# Patient Record
Sex: FEMALE | Race: WHITE | NOT HISPANIC OR LATINO | Employment: FULL TIME | URBAN - METROPOLITAN AREA
[De-identification: names, ages, dates, MRNs, and addresses within clinical notes are randomized per-mention and may not be internally consistent; named-entity substitution may affect disease eponyms.]

---

## 2018-01-08 PROBLEM — N80.30 PELVIC PERITONEAL ENDOMETRIOSIS: Chronic | Status: ACTIVE | Noted: 2018-01-08

## 2018-01-08 PROBLEM — N85.2 ENLARGED UTERUS: Chronic | Status: RESOLVED | Noted: 2018-01-08 | Resolved: 2018-01-08

## 2018-01-08 PROBLEM — G89.29 CHRONIC PELVIC PAIN IN FEMALE: Status: ACTIVE | Noted: 2018-01-08

## 2018-01-08 PROBLEM — R10.2 CHRONIC PELVIC PAIN IN FEMALE: Status: ACTIVE | Noted: 2018-01-08

## 2018-01-08 PROBLEM — N85.2 ENLARGED UTERUS: Chronic | Status: ACTIVE | Noted: 2018-01-08

## 2018-12-10 ENCOUNTER — OFFICE VISIT (OUTPATIENT)
Dept: URGENT CARE | Facility: CLINIC | Age: 33
End: 2018-12-10
Payer: COMMERCIAL

## 2018-12-10 VITALS
HEART RATE: 72 BPM | OXYGEN SATURATION: 100 % | TEMPERATURE: 99 F | DIASTOLIC BLOOD PRESSURE: 85 MMHG | BODY MASS INDEX: 22.88 KG/M2 | WEIGHT: 151 LBS | RESPIRATION RATE: 19 BRPM | HEIGHT: 68 IN | SYSTOLIC BLOOD PRESSURE: 119 MMHG

## 2018-12-10 DIAGNOSIS — J01.90 ACUTE SINUSITIS, RECURRENCE NOT SPECIFIED, UNSPECIFIED LOCATION: Primary | ICD-10-CM

## 2018-12-10 PROCEDURE — 96372 THER/PROPH/DIAG INJ SC/IM: CPT | Mod: S$GLB,,, | Performed by: PHYSICIAN ASSISTANT

## 2018-12-10 PROCEDURE — 99214 OFFICE O/P EST MOD 30 MIN: CPT | Mod: 25,S$GLB,, | Performed by: PHYSICIAN ASSISTANT

## 2018-12-10 PROCEDURE — 3008F BODY MASS INDEX DOCD: CPT | Mod: CPTII,S$GLB,, | Performed by: PHYSICIAN ASSISTANT

## 2018-12-10 RX ORDER — SPIRONOLACTONE 50 MG/1
50 TABLET, FILM COATED ORAL DAILY
COMMUNITY

## 2018-12-10 RX ORDER — BENZONATATE 100 MG/1
100 CAPSULE ORAL EVERY 6 HOURS PRN
Qty: 28 CAPSULE | Refills: 0 | Status: SHIPPED | OUTPATIENT
Start: 2018-12-10 | End: 2018-12-17

## 2018-12-10 RX ORDER — BETAMETHASONE SODIUM PHOSPHATE AND BETAMETHASONE ACETATE 3; 3 MG/ML; MG/ML
9 INJECTION, SUSPENSION INTRA-ARTICULAR; INTRALESIONAL; INTRAMUSCULAR; SOFT TISSUE ONCE
Status: COMPLETED | OUTPATIENT
Start: 2018-12-10 | End: 2018-12-10

## 2018-12-10 RX ORDER — AZITHROMYCIN 250 MG/1
TABLET, FILM COATED ORAL
Qty: 6 TABLET | Refills: 0 | Status: SHIPPED | OUTPATIENT
Start: 2018-12-10 | End: 2020-10-22

## 2018-12-10 RX ORDER — PROMETHAZINE HYDROCHLORIDE AND PHENYLEPHRINE HYDROCHLORIDE 6.25; 5 MG/5ML; MG/5ML
5 SYRUP ORAL 4 TIMES DAILY
Qty: 118 ML | Refills: 0 | Status: SHIPPED | OUTPATIENT
Start: 2018-12-10 | End: 2018-12-20

## 2018-12-10 RX ADMIN — BETAMETHASONE SODIUM PHOSPHATE AND BETAMETHASONE ACETATE 9 MG: 3; 3 INJECTION, SUSPENSION INTRA-ARTICULAR; INTRALESIONAL; INTRAMUSCULAR; SOFT TISSUE at 11:12

## 2018-12-10 NOTE — PROGRESS NOTES
"Subjective:       Patient ID: Therese Rivera is a 33 y.o. female.    Vitals:  height is 5' 8" (1.727 m) and weight is 68.5 kg (151 lb). Her temperature is 98.5 °F (36.9 °C). Her blood pressure is 119/85 and her pulse is 72. Her respiration is 19 and oxygen saturation is 100%.     Chief Complaint: Cough    Cough   Episode onset: 2 weeks. The problem has been rapidly worsening. The cough is productive of sputum. Associated symptoms include shortness of breath. Pertinent negatives include no chest pain, chills, ear pain, eye redness, fever, hemoptysis, myalgias, rash, sore throat or wheezing. She has tried OTC cough suppressant for the symptoms. The treatment provided no relief.       Constitution: Negative for chills, sweating, fatigue and fever.   HENT: Positive for congestion. Negative for ear pain, sinus pain, sinus pressure, sore throat and voice change.    Neck: Negative for painful lymph nodes.   Cardiovascular: Negative for chest pain.   Eyes: Negative for eye redness.   Respiratory: Positive for chest tightness, cough, sputum production and shortness of breath. Negative for bloody sputum, COPD, stridor, wheezing and asthma.    Gastrointestinal: Negative for nausea and vomiting.   Musculoskeletal: Negative for muscle ache.   Skin: Negative for rash.   Allergic/Immunologic: Negative for seasonal allergies and asthma.   Hematologic/Lymphatic: Negative for swollen lymph nodes.       Objective:      Physical Exam   Constitutional: She is oriented to person, place, and time. She appears well-developed and well-nourished. She is cooperative.  Non-toxic appearance. She does not appear ill. No distress.   HENT:   Head: Normocephalic and atraumatic.   Right Ear: Hearing, external ear and ear canal normal. Tympanic membrane is not erythematous. A middle ear effusion (serous) is present.   Left Ear: Hearing, external ear and ear canal normal. Tympanic membrane is not erythematous. A middle ear effusion (serous) is " present.   Nose: Mucosal edema present. No rhinorrhea or nasal deformity. No epistaxis. Right sinus exhibits no maxillary sinus tenderness and no frontal sinus tenderness. Left sinus exhibits no maxillary sinus tenderness and no frontal sinus tenderness.   Mouth/Throat: Uvula is midline, oropharynx is clear and moist and mucous membranes are normal. No trismus in the jaw. Normal dentition. No uvula swelling. No oropharyngeal exudate, posterior oropharyngeal edema or posterior oropharyngeal erythema.       Eyes: Conjunctivae and lids are normal. No scleral icterus.   Sclera clear bilat   Neck: Trachea normal, full passive range of motion without pain and phonation normal. Neck supple.   Cardiovascular: Normal rate, regular rhythm, normal heart sounds, intact distal pulses and normal pulses.   Pulmonary/Chest: Effort normal and breath sounds normal. No respiratory distress. She has no decreased breath sounds. She has no wheezes. She has no rhonchi.   nonproductive cough   Abdominal: Soft. Normal appearance and bowel sounds are normal. She exhibits no distension. There is no tenderness.   Musculoskeletal: Normal range of motion. She exhibits no edema or deformity.   Neurological: She is alert and oriented to person, place, and time. She exhibits normal muscle tone. Coordination normal.   Skin: Skin is warm, dry and intact. She is not diaphoretic. No pallor.   Psychiatric: She has a normal mood and affect. Her speech is normal and behavior is normal. Judgment and thought content normal. Cognition and memory are normal.   Nursing note and vitals reviewed.      Assessment:       1. Acute sinusitis, recurrence not specified, unspecified location        Plan:         Acute sinusitis, recurrence not specified, unspecified location  -     azithromycin (Z-ALESIA) 250 MG tablet; Take 2 tablets by mouth on day 1; Take 1 tablet by mouth on days 2-5  Dispense: 6 tablet; Refill: 0  -     betamethasone acetate-betamethasone sodium  phosphate injection 9 mg  -     promethazine-phenylephrine (PROMETHAZINE VC) 6.25-5 mg/5 mL Syrp; Take 5 mLs by mouth 4 (four) times daily. for 10 days  Dispense: 118 mL; Refill: 0  -     benzonatate (TESSALON PERLES) 100 MG capsule; Take 1 capsule (100 mg total) by mouth every 6 (six) hours as needed for Cough.  Dispense: 28 capsule; Refill: 0      Patient Instructions   · Follow up with your primary care in 2-5 days if symptoms have not improved, or you may return here.  · If you were referred to a specialist, please follow up with that specialty.  · If you were prescribed antibiotics, please take them to completion.  · If you were prescribed a narcotic or any medication with sedative effects, do not drive or operate heavy equipment or machinery while taking these medications.  · You must understand that you have received treatment at an Urgent Care facility only, and that you may be released before all of your medical problems are known or treated. Urgent Care facilities are not equipped to handle life threatening emergencies. It is recommended that you go to an Emergency Department for further evaluation of worsening or concerning symptoms, or possibly life threatening conditions as discussed.                                        If you  smoke, please stop smoking    You have been given a prescription for antibiotics (AZITHROMYCIN). Your symptoms will likely resolve without antibiotics. It is recommended that you monitor yourself closely for 3-5 days and fill this prescription and start the antibiotic only if signs of infection, as discussed at your visit, are present. It is important to follow these instructions due to the problem of increasing antibiotic resistance.    Symptomatic treatment:    Alternate tylenol and motrin every 4-6 hours  salt water gargles  Cold-eeze helps to reduce the duration of sore throat symptoms  Cepachol helps to numb the discomfort  Chloroseptic spray  Nasal saline spray reduces  inflammation and dryness  Warm face compresses as often as you can  Vicks vapor rub at night  Flonase OTC or Nasacort OTC  Simple foods like chicken noodle soup help  Pedialyte helps with dehydration if lacking appetite  Rest as much as you can

## 2018-12-10 NOTE — PATIENT INSTRUCTIONS

## 2020-07-06 ENCOUNTER — OFFICE VISIT (OUTPATIENT)
Dept: UROGYNECOLOGY | Facility: CLINIC | Age: 35
End: 2020-07-06
Payer: MEDICAID

## 2020-07-06 VITALS
BODY MASS INDEX: 23.19 KG/M2 | DIASTOLIC BLOOD PRESSURE: 62 MMHG | WEIGHT: 153 LBS | HEIGHT: 68 IN | SYSTOLIC BLOOD PRESSURE: 128 MMHG

## 2020-07-06 DIAGNOSIS — N30.10 IC (INTERSTITIAL CYSTITIS): Primary | ICD-10-CM

## 2020-07-06 DIAGNOSIS — N30.30 CHRONIC TRIGONITIS: ICD-10-CM

## 2020-07-06 DIAGNOSIS — N32.81 OAB (OVERACTIVE BLADDER): ICD-10-CM

## 2020-07-06 LAB
BILIRUB UR QL STRIP: NEGATIVE
GLUCOSE UR QL STRIP: NEGATIVE
KETONES UR QL STRIP: NEGATIVE
LEUKOCYTE ESTERASE UR QL STRIP: NEGATIVE
PH, POC UA: 6
POC BLOOD, URINE: NEGATIVE
POC NITRATES, URINE: NEGATIVE
PROT UR QL STRIP: NEGATIVE
SP GR UR STRIP: 1.01 (ref 1–1.03)
UROBILINOGEN UR STRIP-ACNC: 0.1 (ref 0.1–1.1)

## 2020-07-06 PROCEDURE — 99999 PR PBB SHADOW E&M-EST. PATIENT-LVL III: CPT | Mod: PBBFAC,,, | Performed by: OBSTETRICS & GYNECOLOGY

## 2020-07-06 PROCEDURE — 99204 OFFICE O/P NEW MOD 45 MIN: CPT | Mod: S$PBB,,, | Performed by: OBSTETRICS & GYNECOLOGY

## 2020-07-06 PROCEDURE — 99204 PR OFFICE/OUTPT VISIT, NEW, LEVL IV, 45-59 MIN: ICD-10-PCS | Mod: S$PBB,,, | Performed by: OBSTETRICS & GYNECOLOGY

## 2020-07-06 PROCEDURE — 99999 PR PBB SHADOW E&M-EST. PATIENT-LVL III: ICD-10-PCS | Mod: PBBFAC,,, | Performed by: OBSTETRICS & GYNECOLOGY

## 2020-07-06 PROCEDURE — 99213 OFFICE O/P EST LOW 20 MIN: CPT | Mod: PBBFAC | Performed by: OBSTETRICS & GYNECOLOGY

## 2020-07-06 RX ORDER — HYDROXYZINE HYDROCHLORIDE 25 MG/1
25 TABLET, FILM COATED ORAL NIGHTLY
Qty: 30 TABLET | Refills: 3 | Status: SHIPPED | OUTPATIENT
Start: 2020-07-06 | End: 2020-08-05

## 2020-07-06 RX ORDER — PENTOSAN POLYSULFATE SODIUM 100 MG/1
100 CAPSULE, GELATIN COATED ORAL 3 TIMES DAILY
COMMUNITY
End: 2020-10-07

## 2020-07-06 RX ORDER — HYDROXYZINE HYDROCHLORIDE 25 MG/1
25 TABLET, FILM COATED ORAL 3 TIMES DAILY
COMMUNITY

## 2020-07-06 NOTE — PATIENT INSTRUCTIONS
next list.  The Most Bothersome Foods* The Least Bothersome Foods*   Coffee - Regular & Decaf  Tea - caffeinated  Carbonated beverages - cola, non-joey, diet & caffeine-free  Alcohols - Beer, Red Wine, White Wine, Champagne  Fruits - Grapefruit, Lemon, Orange, Pineapple  Fruit Juices - Cranberry, Grapefruit, Orange, Pineapple  Vegetables - Tomato & Tomato Products  Flavor Enhancers - Hot peppers, Spicy foods, Chili, Horseradish, Vinegar, Monosodium glutamate (MSG)  Artificial Sweeteners - NutraSweet, Sweet N Low, Equal (sweetener), Saccharin  Ethnic foods - Mexican, Baljinder, [place] food Water  Milk - low-fat & whole  Fruits - Bananas, Blueberries, Honeydew melon, Pears, Raisins, Watermelon  Vegetables - Broccoli, [place] Sprouts, Cabbage, Carrots, Cauliflower, Celery, Cucumber, Mushrooms, Peas, Radishes, Squash, Zucchini, White potatoes, Sweet potatoes & yams  Poultry - Chicken, Eggs, Turkey,  Meat - Beef, Pork, Lamb  Seafood - Shrimp, Tuna fish, [place]  Grains - Oat, Rice  Snacks - Pretzels, Popcorn   *Tj JOHNSON et al. Diet and its role in interstitial cystitis/bladder pain syndrome (IC/BPS) and comorbid conditions. [employer]. [employer]. 2012 Jan 11.

## 2020-07-06 NOTE — PROGRESS NOTES
Subjective:      Patient ID: Therese Rivera is a 35 y.o. female.    Chief Complaint:  Consult, Urinary Incontinence (EZ), Other (IC), and Dyspareunia      History of Present Illness  35-year-old para 2 with history of hysterectomy as well as LS so secondary to endometriosis chronic pelvic pain patient now with urinary frequency and pressure.  Patient is known about her IC in the past.  Patient has tried Elmiron has actually been doing very well but now is having exacerbation is requesting assistance   Addition is also used hydroxyzine but not the concomitantly with Elmiron        History reviewed. No pertinent past medical history.    Past Surgical History:   Procedure Laterality Date    HYSTEROSCOPY      OOPHORECTOMY         GYN & OB History  Patient's last menstrual period was 2017 (exact date).   Date of Last Pap: 2018    OB History    Para Term  AB Living   2 2 0 0 0 0   SAB TAB Ectopic Multiple Live Births   0 0 0 0 2      # Outcome Date GA Lbr Frank/2nd Weight Sex Delivery Anes PTL Lv   2 Para            1 Para                Health Maintenance       Date Due Completion Date    Lipid Panel 1985 ---    HIV Screening 2000 ---    TETANUS VACCINE 2003 ---    Influenza Vaccine (1) 2020 ---          Family History   Problem Relation Age of Onset    Cancer Mother     Hypertension Father     COPD Father     Heart disease Father        Social History     Socioeconomic History    Marital status:      Spouse name: Not on file    Number of children: Not on file    Years of education: Not on file    Highest education level: Not on file   Occupational History    Not on file   Social Needs    Financial resource strain: Not on file    Food insecurity     Worry: Not on file     Inability: Not on file    Transportation needs     Medical: Not on file     Non-medical: Not on file   Tobacco Use    Smoking status: Never Smoker    Smokeless tobacco: Never Used  "  Substance and Sexual Activity    Alcohol use: Not on file    Drug use: Not on file    Sexual activity: Not on file   Lifestyle    Physical activity     Days per week: Not on file     Minutes per session: Not on file    Stress: Not on file   Relationships    Social connections     Talks on phone: Not on file     Gets together: Not on file     Attends [employer]regation service: Not on file     Active member of club or organization: Not on file     Attends meetings of clubs or organizations: Not on file     Relationship status: Not on file   Other Topics Concern    Not on file   Social History Narrative    Not on file       Review of Systems  Review of Systems   Genitourinary: Positive for frequency, pelvic pain and vaginal pain.          Objective:   /62   Ht 5' 8" (1.727 m)   Wt 69.4 kg (153 lb)   LMP 12/20/2017 (Exact Date)   BMI 23.26 kg/m²     Physical Exam   Anatomy is completely normal there is slight trigone tenderness  Assessment:     1. IC (interstitial cystitis)    2. Chronic trigonitis    3. OAB (overactive bladder)            Plan:     1. IC (interstitial cystitis)    2. Chronic trigonitis    3. OAB (overactive bladder)      After examination had patient present my office for we then talked at great length regarding my findings patient was very well versus she knows the hydro control her symptoms with diet and she is concerned that the exercise brought this latest flare on at this point I see no trauma to the surrounding tissue I do believe that this is a level of trigonitis which we use the web site to discuss a great length in terms of imagery so were going to go with  Bladder friendly diet which she is familiar with  Hydroxyzine at night  Urine bell 1 tablet 4 times a day  Myrbetriq.  Have given her coupon codes.  Patient many questions all addressed total time of this visit 45 min greater than 50% time 30 min direct discussion reviewing importance of medication patient will follow up with " me in 4 weeks  Patient Instructions           next list.  The Most Bothersome Foods* The Least Bothersome Foods*   Coffee - Regular & Decaf  Tea - caffeinated  Carbonated beverages - cola, non-joey, diet & caffeine-free  Alcohols - Beer, Red Wine, White Wine, Champagne  Fruits - Grapefruit, Lemon, Orange, Pineapple  Fruit Juices - Cranberry, Grapefruit, Orange, Pineapple  Vegetables - Tomato & Tomato Products  Flavor Enhancers - Hot peppers, Spicy foods, Chili, Horseradish, Vinegar, Monosodium glutamate (MSG)  Artificial Sweeteners - NutraSweet, Sweet N Low, Equal (sweetener), Saccharin  Ethnic foods - Mexican, Baljinder,  food Water  Milk - low-fat & whole  Fruits - Bananas, Blueberries, Honeydew melon, Pears, Raisins, Watermelon  Vegetables - Broccoli, [place] Sprouts, Cabbage, Carrots, Cauliflower, Celery, Cucumber, Mushrooms, Peas, Radishes, Squash, Zucchini, White potatoes, Sweet potatoes & yams  Poultry - Chicken, Eggs, Turkey,  Meat - Beef, Pork, Lamb  Seafood - Shrimp, Tuna fish, [place]  Grains - Oat, Rice  Snacks - Pretzels, Popcorn   *Tj JOHNSON et al. Diet and its role in interstitial cystitis/bladder pain syndrome (IC/BPS) and comorbid conditions. [employer]. [employer]. 2012 Jan 11.

## 2020-08-13 ENCOUNTER — OFFICE VISIT (OUTPATIENT)
Dept: UROGYNECOLOGY | Facility: CLINIC | Age: 35
End: 2020-08-13
Payer: MEDICAID

## 2020-08-13 VITALS
HEIGHT: 68 IN | WEIGHT: 162 LBS | SYSTOLIC BLOOD PRESSURE: 121 MMHG | BODY MASS INDEX: 24.55 KG/M2 | DIASTOLIC BLOOD PRESSURE: 62 MMHG

## 2020-08-13 DIAGNOSIS — N30.30 CHRONIC TRIGONITIS: ICD-10-CM

## 2020-08-13 DIAGNOSIS — N30.10 IC (INTERSTITIAL CYSTITIS): Primary | ICD-10-CM

## 2020-08-13 PROCEDURE — 99999 PR PBB SHADOW E&M-EST. PATIENT-LVL III: ICD-10-PCS | Mod: PBBFAC,,, | Performed by: OBSTETRICS & GYNECOLOGY

## 2020-08-13 PROCEDURE — 99213 OFFICE O/P EST LOW 20 MIN: CPT | Mod: PBBFAC | Performed by: OBSTETRICS & GYNECOLOGY

## 2020-08-13 PROCEDURE — 99999 PR PBB SHADOW E&M-EST. PATIENT-LVL III: CPT | Mod: PBBFAC,,, | Performed by: OBSTETRICS & GYNECOLOGY

## 2020-08-13 PROCEDURE — 99214 PR OFFICE/OUTPT VISIT, EST, LEVL IV, 30-39 MIN: ICD-10-PCS | Mod: S$PBB,,, | Performed by: OBSTETRICS & GYNECOLOGY

## 2020-08-13 PROCEDURE — 99214 OFFICE O/P EST MOD 30 MIN: CPT | Mod: S$PBB,,, | Performed by: OBSTETRICS & GYNECOLOGY

## 2020-08-13 RX ORDER — MIRABEGRON 25 MG/1
25 TABLET, FILM COATED, EXTENDED RELEASE ORAL DAILY
Qty: 30 TABLET | Refills: 11 | Status: SHIPPED | OUTPATIENT
Start: 2020-08-13 | End: 2021-08-13

## 2020-08-13 RX ORDER — NITROFURANTOIN 25; 75 MG/1; MG/1
100 CAPSULE ORAL DAILY
Qty: 30 CAPSULE | Refills: 0 | Status: SHIPPED | OUTPATIENT
Start: 2020-08-13 | End: 2020-08-13

## 2020-08-13 NOTE — PROGRESS NOTES
"Subjective:      Patient ID: Therese Rivera is a 35 y.o. female.    Chief Complaint:  Follow-up (Pt. reports "some improvement"pt. also stated she d/c'ed Myrbetriq x1wk. due to inability to completely empty bladder while taking Myrbertiq )      History of Present Illness  Patient states she is better but still has some discomfort in she had stop the Myrbetriq because she was not emptying completely      No past medical history on file.    Past Surgical History:   Procedure Laterality Date    HYSTEROSCOPY      OOPHORECTOMY         GYN & OB History  Patient's last menstrual period was 2017 (exact date).   Date of Last Pap: 2018    OB History    Para Term  AB Living   2 2 0 0 0 0   SAB TAB Ectopic Multiple Live Births   0 0 0 0 2      # Outcome Date GA Lbr Frank/2nd Weight Sex Delivery Anes PTL Lv   2 Para            1 Para                Health Maintenance       Date Due Completion Date    Hepatitis C Screening 1985 ---    Lipid Panel 1985 ---    HIV Screening 2000 ---    TETANUS VACCINE 2003 ---    Influenza Vaccine (1) 2020 ---          Family History   Problem Relation Age of Onset    Cancer Mother     Hypertension Father     COPD Father     Heart disease Father        Social History     Socioeconomic History    Marital status:      Spouse name: Not on file    Number of children: Not on file    Years of education: Not on file    Highest education level: Not on file   Occupational History    Not on file   Social Needs    Financial resource strain: Not on file    Food insecurity     Worry: Not on file     Inability: Not on file    Transportation needs     Medical: Not on file     Non-medical: Not on file   Tobacco Use    Smoking status: Never Smoker    Smokeless tobacco: Never Used   Substance and Sexual Activity    Alcohol use: Not on file    Drug use: Not on file    Sexual activity: Not on file   Lifestyle    Physical activity     Days " "per week: Not on file     Minutes per session: Not on file    Stress: Not on file   Relationships    Social connections     Talks on phone: Not on file     Gets together: Not on file     Attends [employer] service: Not on file     Active member of club or organization: Not on file     Attends meetings of clubs or organizations: Not on file     Relationship status: Not on file   Other Topics Concern    Not on file   Social History Narrative    Not on file       Review of Systems  Review of Systems  The odor of urine     Objective:   /62   Ht 5' 8" (1.727 m)   Wt 73.5 kg (162 lb)   LMP 12/20/2017 (Exact Date)   BMI 24.63 kg/m²     Physical Exam   Trigone improve  Assessment:     1. IC (interstitial cystitis)    2. Chronic trigonitis            Plan:     1. IC (interstitial cystitis)    2. Chronic trigonitis        After examination had patient presented my office we then discussed my findings.  I am going to decrease the Myrbetriq to 25    Patient will continue all medicines for the next 2 weeks.  Then she will start with bladder installations  Dimethyl sulfoxide 50 mg  Lidocaine 10 cc  Heparin 02824 units  Decadron 4 mg    Patient will do this once a week for 4 weeks.  Patient will then follow up with me if there is the substantial improvement patient will complete a 6 weeks of bladder installation if there is no improvement we will go for hydro distension there is still no improvement at the completion after hydro distension the patient will be referred out for higher level of care    Total time of this visit 25 min greater 50% time 15 min in direct face-to-face discussion reviewing all aspects of care and answering patient's questions    Patient Instructions               "

## 2020-08-26 ENCOUNTER — OFFICE VISIT (OUTPATIENT)
Dept: UROGYNECOLOGY | Facility: CLINIC | Age: 35
End: 2020-08-26
Payer: MEDICAID

## 2020-08-26 VITALS
WEIGHT: 163.19 LBS | BODY MASS INDEX: 24.73 KG/M2 | DIASTOLIC BLOOD PRESSURE: 72 MMHG | HEIGHT: 68 IN | SYSTOLIC BLOOD PRESSURE: 118 MMHG

## 2020-08-26 DIAGNOSIS — N30.10 IC (INTERSTITIAL CYSTITIS): Primary | ICD-10-CM

## 2020-08-26 PROCEDURE — 99999 PR PBB SHADOW E&M-EST. PATIENT-LVL III: CPT | Mod: PBBFAC,,, | Performed by: PHYSICIAN ASSISTANT

## 2020-08-26 PROCEDURE — 99999 PR PBB SHADOW E&M-EST. PATIENT-LVL III: ICD-10-PCS | Mod: PBBFAC,,, | Performed by: PHYSICIAN ASSISTANT

## 2020-08-26 PROCEDURE — 99213 OFFICE O/P EST LOW 20 MIN: CPT | Mod: PBBFAC | Performed by: PHYSICIAN ASSISTANT

## 2020-08-26 PROCEDURE — 99212 PR OFFICE/OUTPT VISIT, EST, LEVL II, 10-19 MIN: ICD-10-PCS | Mod: S$PBB,,, | Performed by: PHYSICIAN ASSISTANT

## 2020-08-26 PROCEDURE — 99212 OFFICE O/P EST SF 10 MIN: CPT | Mod: S$PBB,,, | Performed by: PHYSICIAN ASSISTANT

## 2020-08-26 RX ORDER — TRIAMCINOLONE ACETONIDE 40 MG/ML
40 INJECTION, SUSPENSION INTRA-ARTICULAR; INTRAMUSCULAR
Status: COMPLETED | OUTPATIENT
Start: 2020-08-26 | End: 2020-08-26

## 2020-08-26 RX ORDER — LIDOCAINE HYDROCHLORIDE 10 MG/ML
1 INJECTION INFILTRATION; PERINEURAL
Status: COMPLETED | OUTPATIENT
Start: 2020-08-26 | End: 2020-08-26

## 2020-08-26 RX ADMIN — DIMETHYL SULFOXIDE 50 ML: 0.54 IRRIGANT INTRAVESICAL at 06:08

## 2020-08-26 RX ADMIN — LIDOCAINE HYDROCHLORIDE 1 ML: 10 INJECTION, SOLUTION INFILTRATION; PERINEURAL at 06:08

## 2020-08-26 RX ADMIN — TRIAMCINOLONE ACETONIDE 40 MG: 40 INJECTION, SUSPENSION INTRA-ARTICULAR; INTRAMUSCULAR at 06:08

## 2020-08-26 RX ADMIN — SODIUM BICARBONATE 2.5 MEQ: 42 INJECTION, SOLUTION INTRAVENOUS at 06:08

## 2020-08-26 RX ADMIN — HEPARIN SODIUM 10000 UNITS: 1000 INJECTION, SOLUTION INTRAVENOUS; SUBCUTANEOUS at 06:08

## 2020-08-26 NOTE — PATIENT INSTRUCTIONS
A #14 red rubber cath inserted into bladder using sterile technique. The urethral meatus was prepped with betadine prior to cath insertion.  50 cc clear blue urine drained from bladder.  A solution of RMSO 50 mLs, 1% lidocaine 20 mL, heparin 10,000 IU/ml 10 mL, kenalog 40 mg, and sodium bicarb 4.2% (2.5 mEq) was instilled into bladder without difficulty. Procedure was tolerated very well. She was instructed to hold urine for one hour before voiding.    RTC in 1 week for 2/6 bladder installation

## 2020-08-26 NOTE — PROGRESS NOTES
[facility] [id number]   [address] [id number]  2020     Therese Miguel  [id number]  1985    UROGYN FOLLOW-UP  2020     35 y.o. female,  with history of hysterectomy as well as LS so secondary to endometriosis chronic pelvic pain patient now with urinary frequency and pressure. Patient is known about her IC in the past. Patient has tried Elmiron has actually been doing very well but now is having exacerbation is requesting assistance   Addition is also used hydroxyzine but not the concomitantly with Elmiron, presents today for bladder installation.     Chief Complaint   Patient presents with    Procedure     bladder instillation #1      History reviewed. No pertinent past medical history.    Past Surgical History:   Procedure Laterality Date    HYSTEROSCOPY      OOPHORECTOMY         Family History   Problem Relation Age of Onset    Cancer Mother     Hypertension Father     COPD Father     Heart disease Father        Social History     Socioeconomic History    Marital status:      Spouse name: Not on file    Number of children: Not on file    Years of education: Not on file    Highest education level: Not on file   Occupational History    Not on file   Social Needs    Financial resource strain: Not on file    Food insecurity     Worry: Not on file     Inability: Not on file    Transportation needs     Medical: Not on file     Non-medical: Not on file   Tobacco Use    Smoking status: Never Smoker    Smokeless tobacco: Never Used   Substance and Sexual Activity    Alcohol use: Not on file    Drug use: Not on file    Sexual activity: Not on file   Lifestyle    Physical activity     Days per week: Not on file     Minutes per session: Not on file    Stress: Not on file   Relationships    Social connections     Talks on phone: Not on file     Gets together: Not on file     Attends [employer] service: Not on file     Active  "member of club or organization: Not on file     Attends meetings of clubs or organizations: Not on file     Relationship status: Not on file   Other Topics Concern    Not on file   Social History Narrative    Not on file       Current Outpatient Medications   Medication Sig Dispense Refill    hydrOXYzine HCL (ATARAX) 25 MG tablet Take 25 mg by mouth 3 (three) times daily.      baljit-m.blue-s.phos-phsal-hyo (URIBEL) 118-10-40.8-36 mg Cap Take 1 capsule by mouth 4 (four) times daily. 120 capsule 3    mirabegron (MYRBETRIQ) 25 mg Tb24 ER tablet Take 1 tablet (25 mg total) by mouth once daily. 30 tablet 11    spironolactone (ALDACTONE) 50 MG tablet Take 50 mg by mouth once daily.      sumatriptan succ/naproxen sod (TREXIMET ORAL) Take by mouth.      azithromycin (Z-ALESIA) 250 MG tablet Take 2 tablets by mouth on day 1; Take 1 tablet by mouth on days 2-5 (Patient not taking: Reported on 2020) 6 tablet 0    pentosan polysulfate (ELMIRON) 100 mg Cap Take 100 mg by mouth 3 (three) times daily.       No current facility-administered medications for this visit.        Review of patient's allergies indicates:  No Known Allergies    OB History        2    Para   2    Term   0       0    AB   0    Living   0       SAB   0    TAB   0    Ectopic   0    Multiple   0    Live Births   2                ROS  As per HPI.      Physical Exam  /72   Ht 5' 8" (1.727 m)   Wt 74 kg (163 lb 3.2 oz)   LMP 2017 (Exact Date)   BMI 24.81 kg/m²   General: alert and oriented, no acute distress  Respiratory: normal respiratory effort  Abd: soft, non-tender, non-distended  Pelvic:  deferred    Impression  1. IC (interstitial cystitis)  Bladder Installation    CANCELED: Bladder Installation     We reviewed the above issues and discussed options for short-term versus long-term management of her problems.     Plan:   A #14 red rubber cath inserted into bladder using sterile technique. The urethral meatus was " prepped with betadine prior to cath insertion.  50 cc clear blue urine drained from bladder.  A solution of RMSO 50 mLs, 1% lidocaine 20 mL, heparin 10,000 IU/ml 10 mL, kenalog 40 mg, and sodium bicarb 4.2% (2.5 mEq) was instilled into bladder without difficulty. Procedure was tolerated very well. She was instructed to hold urine for one hour before voiding.    RTC in 1 week for 2/6 bladder installation    20 minutes were spent in face to face time with this patient  10 % of this time was spent in counseling and/or coordination of care    Marvin Mejia PA-C  Division of Female Pelvic Medicine and Reconstructive Surgery  Department of Obstetrics & Gynecology  [facility] [place], [place]

## 2020-08-26 NOTE — PROCEDURES
Procedures   1/6  A #14 red rubber cath inserted into bladder using sterile technique. The urethral meatus was prepped with betadine prior to cath insertion.  50 cc clear blue urine drained from bladder.  A solution of RMSO 50 mLs, 1% lidocaine 20 mL, heparin 10,000 IU/ml 10 mL, kenalog 40 mg, and sodium bicarb 4.2% (2.5 mEq) was instilled into bladder without difficulty. Procedure was tolerated very well. She was instructed to hold urine for one hour before voiding.

## 2020-09-01 NOTE — PROGRESS NOTES
2/6  A #14 red rubber cath inserted into bladder using sterile technique. The urethral meatus was prepped with betadine prior to cath insertion. *** cc ***yellow urine drained from bladder.  A solution of RMSO 50 mLs, 1% lidocaine 20 mL, heparin 10,000 IU/ml 10 mL, kenalog 40 mg, and sodium bicarb 4.2% (2.5 mEq) was instilled into bladder without difficulty. Procedure was tolerated very well. She was instructed to hold urine for one hour before voiding.

## 2020-09-02 ENCOUNTER — PROCEDURE VISIT (OUTPATIENT)
Dept: UROGYNECOLOGY | Facility: CLINIC | Age: 35
End: 2020-09-02
Payer: MEDICAID

## 2020-09-02 VITALS
SYSTOLIC BLOOD PRESSURE: 122 MMHG | DIASTOLIC BLOOD PRESSURE: 68 MMHG | BODY MASS INDEX: 25.13 KG/M2 | HEIGHT: 68 IN | WEIGHT: 165.81 LBS

## 2020-09-02 DIAGNOSIS — N30.10 IC (INTERSTITIAL CYSTITIS): Primary | ICD-10-CM

## 2020-09-02 PROCEDURE — 51700 PR IRRIGATION, BLADDER: ICD-10-PCS | Mod: ,,, | Performed by: PHYSICIAN ASSISTANT

## 2020-09-02 PROCEDURE — 51700 IRRIGATION OF BLADDER: CPT | Mod: ,,, | Performed by: PHYSICIAN ASSISTANT

## 2020-09-02 RX ORDER — NITROFURANTOIN 25; 75 MG/1; MG/1
CAPSULE ORAL
COMMUNITY
Start: 2020-08-14 | End: 2020-10-22

## 2020-09-02 RX ORDER — TRIAMCINOLONE ACETONIDE 40 MG/ML
40 INJECTION, SUSPENSION INTRA-ARTICULAR; INTRAMUSCULAR
Status: COMPLETED | OUTPATIENT
Start: 2020-09-02 | End: 2020-09-02

## 2020-09-02 RX ORDER — BUPIVACAINE HYDROCHLORIDE 5 MG/ML
10 INJECTION, SOLUTION EPIDURAL; INTRACAUDAL
Status: COMPLETED | OUTPATIENT
Start: 2020-09-02 | End: 2020-09-02

## 2020-09-02 RX ORDER — BUPIVACAINE HYDROCHLORIDE 5 MG/ML
20 INJECTION, SOLUTION PERINEURAL
Status: DISCONTINUED | OUTPATIENT
Start: 2020-09-02 | End: 2020-09-02

## 2020-09-02 RX ADMIN — TRIAMCINOLONE ACETONIDE 40 MG: 40 INJECTION, SUSPENSION INTRA-ARTICULAR; INTRAMUSCULAR at 10:09

## 2020-09-02 RX ADMIN — SODIUM BICARBONATE 2.5 MEQ: 42 INJECTION, SOLUTION INTRAVENOUS at 10:09

## 2020-09-02 RX ADMIN — HEPARIN SODIUM 10000 UNITS: 1000 INJECTION, SOLUTION INTRAVENOUS; SUBCUTANEOUS at 10:09

## 2020-09-02 RX ADMIN — BUPIVACAINE HYDROCHLORIDE 50 MG: 5 INJECTION, SOLUTION EPIDURAL; INTRACAUDAL; PERINEURAL at 10:09

## 2020-09-02 RX ADMIN — DIMETHYL SULFOXIDE 50 ML: 0.54 IRRIGANT INTRAVESICAL at 10:09

## 2020-09-02 NOTE — PROCEDURES
"Procedures     Bladder installation 2/6    Patient said that she had a great week after first installation, she was able to "live more life" than she has been able to for a long time.     A #14 red rubber cath inserted into bladder using sterile technique. The urethral meatus was prepped with betadine prior to cath insertion x 2.  70 cc clear yellow urine drained from bladder. A solution of RMSO 50 mLs, 1% bupivacaine 10 mL, heparin 10,000 IU/ml 10 mL, kenalog 40 mg, then and sodium bicarb 4.2% (2.5 mEq) was instilled into bladder without difficulty. Procedure was tolerated very well. She was instructed to hold urine for one hour before voiding.  "

## 2020-09-09 ENCOUNTER — PROCEDURE VISIT (OUTPATIENT)
Dept: UROGYNECOLOGY | Facility: CLINIC | Age: 35
End: 2020-09-09
Payer: MEDICAID

## 2020-09-09 VITALS
HEIGHT: 68 IN | HEART RATE: 94 BPM | SYSTOLIC BLOOD PRESSURE: 125 MMHG | WEIGHT: 166 LBS | DIASTOLIC BLOOD PRESSURE: 88 MMHG | BODY MASS INDEX: 25.16 KG/M2

## 2020-09-09 DIAGNOSIS — R39.89 BLADDER PAIN: Primary | ICD-10-CM

## 2020-09-09 DIAGNOSIS — N30.10 IC (INTERSTITIAL CYSTITIS): ICD-10-CM

## 2020-09-09 LAB
BILIRUB SERPL-MCNC: NORMAL MG/DL
BLOOD URINE, POC: NORMAL
CLARITY, POC UA: CLEAR
COLOR, POC UA: YELLOW
GLUCOSE UR QL STRIP: NORMAL
KETONES UR QL STRIP: NORMAL
LEUKOCYTE ESTERASE URINE, POC: NORMAL
NITRITE, POC UA: NORMAL
PH, POC UA: 5
PROTEIN, POC: NORMAL
SPECIFIC GRAVITY, POC UA: 1
UROBILINOGEN, POC UA: NORMAL

## 2020-09-09 PROCEDURE — 81002 URINALYSIS NONAUTO W/O SCOPE: CPT | Mod: PBBFAC | Performed by: PHYSICIAN ASSISTANT

## 2020-09-09 PROCEDURE — 51700 PR IRRIGATION, BLADDER: ICD-10-PCS | Mod: ICN,,, | Performed by: PHYSICIAN ASSISTANT

## 2020-09-09 PROCEDURE — 51700 IRRIGATION OF BLADDER: CPT | Mod: ICN,,, | Performed by: PHYSICIAN ASSISTANT

## 2020-09-09 RX ORDER — TRIAMCINOLONE ACETONIDE 40 MG/ML
40 INJECTION, SUSPENSION INTRA-ARTICULAR; INTRAMUSCULAR
Status: COMPLETED | OUTPATIENT
Start: 2020-09-09 | End: 2020-09-09

## 2020-09-09 RX ORDER — BUPIVACAINE HYDROCHLORIDE 5 MG/ML
10 INJECTION, SOLUTION EPIDURAL; INTRACAUDAL
Status: COMPLETED | OUTPATIENT
Start: 2020-09-09 | End: 2020-09-09

## 2020-09-09 RX ADMIN — HEPARIN SODIUM 10000 UNITS: 1000 INJECTION, SOLUTION INTRAVENOUS; SUBCUTANEOUS at 06:09

## 2020-09-09 RX ADMIN — BUPIVACAINE HYDROCHLORIDE 50 MG: 5 INJECTION, SOLUTION EPIDURAL; INTRACAUDAL; PERINEURAL at 06:09

## 2020-09-09 RX ADMIN — SODIUM BICARBONATE 2.5 MEQ: 0.2 INJECTION, SOLUTION INTRAVENOUS at 06:09

## 2020-09-09 RX ADMIN — TRIAMCINOLONE ACETONIDE 40 MG: 40 INJECTION, SUSPENSION INTRA-ARTICULAR; INTRAMUSCULAR at 06:09

## 2020-09-09 RX ADMIN — DIMETHYL SULFOXIDE 50 ML: 0.54 IRRIGANT INTRAVESICAL at 06:09

## 2020-09-09 NOTE — PROCEDURES
Procedures     Bladder installation 3/6  A #14 red rubber cath inserted into bladder using sterile technique. The urethral meatus was prepped with betadine prior to cath insertion x 2.  30 cc clear yellow urine drained from bladder. A solution of RMSO 50 mLs, 1% bupivacaine 10 mL, heparin 10,000 IU/ml 10 mL, kenalog 40 mg, and lasltly sodium bicarb 4.2% (2.5 mEq) was instilled into bladder without difficulty. Procedure was tolerated very well. She was instructed to hold urine for one hour before voiding.

## 2020-09-16 ENCOUNTER — PROCEDURE VISIT (OUTPATIENT)
Dept: UROGYNECOLOGY | Facility: CLINIC | Age: 35
End: 2020-09-16
Payer: MEDICAID

## 2020-09-16 VITALS — BODY MASS INDEX: 25.01 KG/M2 | WEIGHT: 165 LBS | HEIGHT: 68 IN

## 2020-09-16 DIAGNOSIS — N30.10 IC (INTERSTITIAL CYSTITIS): Primary | ICD-10-CM

## 2020-09-16 PROCEDURE — 51700 PR IRRIGATION, BLADDER: ICD-10-PCS | Mod: ,,, | Performed by: PHYSICIAN ASSISTANT

## 2020-09-16 PROCEDURE — 51700 IRRIGATION OF BLADDER: CPT | Mod: ,,, | Performed by: PHYSICIAN ASSISTANT

## 2020-09-16 RX ORDER — TRIAMCINOLONE ACETONIDE 40 MG/ML
40 INJECTION, SUSPENSION INTRA-ARTICULAR; INTRAMUSCULAR
Status: COMPLETED | OUTPATIENT
Start: 2020-09-16 | End: 2020-09-30

## 2020-09-16 RX ORDER — BUPIVACAINE HYDROCHLORIDE 5 MG/ML
10 INJECTION, SOLUTION EPIDURAL; INTRACAUDAL
Status: COMPLETED | OUTPATIENT
Start: 2020-09-16 | End: 2020-09-30

## 2020-09-16 RX ORDER — SPIRONOLACTONE 25 MG/1
TABLET ORAL
COMMUNITY
End: 2020-10-22

## 2020-09-16 RX ADMIN — TRIAMCINOLONE ACETONIDE 40 MG: 40 INJECTION, SUSPENSION INTRA-ARTICULAR; INTRAMUSCULAR at 05:09

## 2020-09-16 RX ADMIN — BUPIVACAINE HYDROCHLORIDE 50 MG: 5 INJECTION, SOLUTION EPIDURAL; INTRACAUDAL; PERINEURAL at 05:09

## 2020-09-16 RX ADMIN — DIMETHYL SULFOXIDE 50 ML: 0.54 IRRIGANT INTRAVESICAL at 05:09

## 2020-09-16 RX ADMIN — SODIUM BICARBONATE 2.5 MEQ: 42 INJECTION, SOLUTION INTRAVENOUS at 05:09

## 2020-09-16 RX ADMIN — HEPARIN SODIUM 10000 UNITS: 1000 INJECTION, SOLUTION INTRAVENOUS; SUBCUTANEOUS at 05:09

## 2020-09-16 NOTE — PROGRESS NOTES
Bladder installation 4/6  A #14 red rubber cath inserted into bladder using sterile technique. The urethral meatus was prepped with betadine prior to cath insertion x 2. 10 cc clear yellow urine drained from bladder. A solution of RMSO 50 mLs, 1% bupivacaine 10 mL, heparin 10,000 IU/ml 10 mL, kenalog 40 mg, and lasltly sodium bicarb 4.2% (2.5 mEq) was instilled into bladder without difficulty. Procedure was tolerated very well. She was instructed to hold urine for one hour before voiding.

## 2020-09-20 ENCOUNTER — PATIENT MESSAGE (OUTPATIENT)
Dept: UROGYNECOLOGY | Facility: CLINIC | Age: 35
End: 2020-09-20

## 2020-09-22 ENCOUNTER — TELEPHONE (OUTPATIENT)
Dept: SLEEP MEDICINE | Facility: CLINIC | Age: 35
End: 2020-09-22

## 2020-09-22 ENCOUNTER — TELEPHONE (OUTPATIENT)
Dept: UROGYNECOLOGY | Facility: CLINIC | Age: 35
End: 2020-09-22

## 2020-09-22 NOTE — TELEPHONE ENCOUNTER
I spoke with patient personally. Patient states that she had a culture that resulted staph infection. Therefore will have to cancel bladder installation on 9/23.    Procedure rescheduled on 9/30 at 2pm per patients request. Message sent to CESAR Hui for review.

## 2020-09-22 NOTE — TELEPHONE ENCOUNTER
----- Message from Hodan Dominguez sent at 9/22/2020  8:43 AM CDT -----  Regarding: Call Back  Name of Who is Calling : HERMAN DEWITT [[id number]]    Patient is requesting a call from staff in regards to canceling procedure   .....Please contact to further discuss and advise.    Can the clinic reply by [facility] : No    What Number to Call Back :  [phone]

## 2020-09-22 NOTE — TELEPHONE ENCOUNTER
----- Message from Hodan Dominguez sent at 9/22/2020  8:40 AM CDT -----  Regarding: Call Back  Name of Who is Calling : Jennie ([employer])    Jennie ([employer]) is requesting a call from staff in regards to prior authorization update on patient medication ubrogepant (UBRELVY)tablet 100 mg .....Please contact to further discuss and advise.    Can the clinic reply by [facility] : No    What Number to Call Back :  [phone] ref# [id number]

## 2020-09-30 ENCOUNTER — PROCEDURE VISIT (OUTPATIENT)
Dept: UROGYNECOLOGY | Facility: CLINIC | Age: 35
End: 2020-09-30
Payer: MEDICAID

## 2020-09-30 VITALS
SYSTOLIC BLOOD PRESSURE: 135 MMHG | HEART RATE: 78 BPM | WEIGHT: 164.44 LBS | BODY MASS INDEX: 24.92 KG/M2 | DIASTOLIC BLOOD PRESSURE: 88 MMHG | HEIGHT: 68 IN

## 2020-09-30 DIAGNOSIS — N30.10 IC (INTERSTITIAL CYSTITIS): Primary | ICD-10-CM

## 2020-09-30 DIAGNOSIS — R39.89 BLADDER PAIN: ICD-10-CM

## 2020-09-30 DIAGNOSIS — N30.30 CHRONIC TRIGONITIS: ICD-10-CM

## 2020-09-30 PROCEDURE — 51700 PR IRRIGATION, BLADDER: ICD-10-PCS | Mod: ,,, | Performed by: PHYSICIAN ASSISTANT

## 2020-09-30 PROCEDURE — 51700 IRRIGATION OF BLADDER: CPT | Mod: ,,, | Performed by: PHYSICIAN ASSISTANT

## 2020-09-30 RX ORDER — CLINDAMYCIN HYDROCHLORIDE 300 MG/1
CAPSULE ORAL
COMMUNITY
Start: 2020-09-21 | End: 2020-10-22

## 2020-09-30 RX ORDER — RIFAMPIN 300 MG/1
CAPSULE ORAL
COMMUNITY
Start: 2020-09-21 | End: 2020-10-22

## 2020-09-30 RX ADMIN — SODIUM BICARBONATE 2.5 MEQ: 42 INJECTION, SOLUTION INTRAVENOUS at 05:09

## 2020-09-30 RX ADMIN — BUPIVACAINE HYDROCHLORIDE 50 MG: 5 INJECTION, SOLUTION EPIDURAL; INTRACAUDAL; PERINEURAL at 05:09

## 2020-09-30 RX ADMIN — HEPARIN SODIUM 10000 UNITS: 1000 INJECTION, SOLUTION INTRAVENOUS; SUBCUTANEOUS at 05:09

## 2020-09-30 RX ADMIN — TRIAMCINOLONE ACETONIDE 40 MG: 40 INJECTION, SUSPENSION INTRA-ARTICULAR; INTRAMUSCULAR at 05:09

## 2020-09-30 RX ADMIN — DIMETHYL SULFOXIDE 50 ML: 0.54 IRRIGANT INTRAVESICAL at 05:09

## 2020-09-30 NOTE — PROGRESS NOTES
Bladder installation 5/6  A #14 red rubber cath inserted into bladder using sterile technique. The urethral meatus was prepped with betadine prior to cath insertion x 2.  30 cc clear yellow urine drained from bladder. A solution of RMSO 50 mLs, 1% bupivacaine 10 mL, heparin 10,000 IU/ml 10 mL, kenalog 40 mg, and lasltly sodium bicarb 4.2% (2.5 mEq) was instilled into bladder without difficulty. Procedure was tolerated very well. She was instructed to hold urine for one hour before voiding.

## 2020-09-30 NOTE — PROCEDURES
Procedures     Bladder installation 5/6  A #14 red rubber cath inserted into bladder using sterile technique. The urethral meatus was prepped with betadine prior to cath insertion x 2.  30 cc clear yellow urine drained from bladder. A solution of RMSO 50 mLs, 1% bupivacaine 10 mL, heparin 10,000 IU/ml 10 mL, kenalog 40 mg, and lasltly sodium bicarb 4.2% (2.5 mEq) was instilled into bladder without difficulty. Procedure was tolerated very well. She was instructed to hold urine for one hour before voiding.

## 2020-10-07 ENCOUNTER — PROCEDURE VISIT (OUTPATIENT)
Dept: UROGYNECOLOGY | Facility: CLINIC | Age: 35
End: 2020-10-07
Payer: MEDICAID

## 2020-10-07 VITALS
DIASTOLIC BLOOD PRESSURE: 80 MMHG | WEIGHT: 164.44 LBS | HEIGHT: 68 IN | SYSTOLIC BLOOD PRESSURE: 116 MMHG | BODY MASS INDEX: 24.92 KG/M2

## 2020-10-07 DIAGNOSIS — N30.10 IC (INTERSTITIAL CYSTITIS): Primary | ICD-10-CM

## 2020-10-07 PROCEDURE — 99211 PR OFFICE/OUTPT VISIT, EST, LEVL I: ICD-10-PCS | Mod: ,,, | Performed by: PHYSICIAN ASSISTANT

## 2020-10-07 PROCEDURE — 99211 OFF/OP EST MAY X REQ PHY/QHP: CPT | Mod: ,,, | Performed by: PHYSICIAN ASSISTANT

## 2020-10-07 RX ORDER — TRIAMCINOLONE ACETONIDE 40 MG/ML
40 INJECTION, SUSPENSION INTRA-ARTICULAR; INTRAMUSCULAR
Status: COMPLETED | OUTPATIENT
Start: 2020-10-07 | End: 2020-10-07

## 2020-10-07 RX ORDER — LIDOCAINE HYDROCHLORIDE 10 MG/ML
1 INJECTION INFILTRATION; PERINEURAL
Status: COMPLETED | OUTPATIENT
Start: 2020-10-07 | End: 2020-10-07

## 2020-10-07 RX ADMIN — DIMETHYL SULFOXIDE 50 ML: 0.54 IRRIGANT INTRAVESICAL at 05:10

## 2020-10-07 RX ADMIN — TRIAMCINOLONE ACETONIDE 40 MG: 40 INJECTION, SUSPENSION INTRA-ARTICULAR; INTRAMUSCULAR at 05:10

## 2020-10-07 RX ADMIN — HEPARIN SODIUM 10000 UNITS: 1000 INJECTION, SOLUTION INTRAVENOUS; SUBCUTANEOUS at 05:10

## 2020-10-07 RX ADMIN — LIDOCAINE HYDROCHLORIDE 1 ML: 10 INJECTION, SOLUTION INFILTRATION; PERINEURAL at 05:10

## 2020-10-07 RX ADMIN — SODIUM BICARBONATE 2.5 MEQ: 42 INJECTION, SOLUTION INTRAVENOUS at 05:10

## 2020-10-07 NOTE — PROCEDURES
Procedures     Bladder installation 6/6  A #14 red rubber cath inserted into bladder using sterile technique. The urethral meatus was prepped with betadine prior to cath insertion x 2.  30 cc clear yellow urine drained from bladder. A solution of RMSO 50 mLs, 1% bupivacaine 10 mL, heparin 10,000 IU/ml 10 mL, kenalog 40 mg, and lasltly sodium bicarb 4.2% (2.5 mEq) was instilled into bladder without difficulty. Procedure was tolerated very well. She was instructed to hold urine for one hour before voiding.

## 2020-10-19 ENCOUNTER — PATIENT MESSAGE (OUTPATIENT)
Dept: UROGYNECOLOGY | Facility: CLINIC | Age: 35
End: 2020-10-19

## 2020-10-19 ENCOUNTER — OFFICE VISIT (OUTPATIENT)
Dept: UROGYNECOLOGY | Facility: CLINIC | Age: 35
End: 2020-10-19
Payer: MEDICAID

## 2020-10-19 VITALS
SYSTOLIC BLOOD PRESSURE: 130 MMHG | WEIGHT: 159 LBS | DIASTOLIC BLOOD PRESSURE: 72 MMHG | BODY MASS INDEX: 24.1 KG/M2 | HEIGHT: 68 IN

## 2020-10-19 DIAGNOSIS — N30.30 CHRONIC TRIGONITIS: Primary | ICD-10-CM

## 2020-10-19 DIAGNOSIS — N30.10 IC (INTERSTITIAL CYSTITIS): ICD-10-CM

## 2020-10-19 PROCEDURE — 99213 OFFICE O/P EST LOW 20 MIN: CPT | Mod: S$PBB,,, | Performed by: OBSTETRICS & GYNECOLOGY

## 2020-10-19 PROCEDURE — 99999 PR PBB SHADOW E&M-EST. PATIENT-LVL IV: CPT | Mod: PBBFAC,,, | Performed by: OBSTETRICS & GYNECOLOGY

## 2020-10-19 PROCEDURE — 99214 OFFICE O/P EST MOD 30 MIN: CPT | Mod: PBBFAC | Performed by: OBSTETRICS & GYNECOLOGY

## 2020-10-19 PROCEDURE — 99213 PR OFFICE/OUTPT VISIT, EST, LEVL III, 20-29 MIN: ICD-10-PCS | Mod: S$PBB,,, | Performed by: OBSTETRICS & GYNECOLOGY

## 2020-10-19 PROCEDURE — 99999 PR PBB SHADOW E&M-EST. PATIENT-LVL IV: ICD-10-PCS | Mod: PBBFAC,,, | Performed by: OBSTETRICS & GYNECOLOGY

## 2020-10-19 NOTE — PROGRESS NOTES
Subjective:      Patient ID: Therese Rivera is a 35 y.o. female.    Chief Complaint:  No chief complaint on file.      History of Present Illness  No improvement as per patient  She is on hydroxyzine urine bell Myrbetriq and has done 6 installations          History reviewed. No pertinent past medical history.    Past Surgical History:   Procedure Laterality Date    HYSTEROSCOPY      OOPHORECTOMY         GYN & OB History  Patient's last menstrual period was 2017 (exact date).   Date of Last Pap: 2018    OB History    Para Term  AB Living   2 2 0 0 0 0   SAB TAB Ectopic Multiple Live Births   0 0 0 0 2      # Outcome Date GA Lbr Frank/2nd Weight Sex Delivery Anes PTL Lv   2 Para            1 Para                Health Maintenance       Date Due Completion Date    Hepatitis C Screening 1985 ---    Lipid Panel 1985 ---    HIV Screening 2000 ---    TETANUS VACCINE 2003 ---    Influenza Vaccine (1) 2020 ---          Family History   Problem Relation Age of Onset    Cancer Mother     Hypertension Father     COPD Father     Heart disease Father        Social History     Socioeconomic History    Marital status:      Spouse name: Not on file    Number of children: Not on file    Years of education: Not on file    Highest education level: Not on file   Occupational History    Not on file   Social Needs    Financial resource strain: Not on file    Food insecurity     Worry: Not on file     Inability: Not on file    Transportation needs     Medical: Not on file     Non-medical: Not on file   Tobacco Use    Smoking status: Never Smoker    Smokeless tobacco: Never Used   Substance and Sexual Activity    Alcohol use: Not on file    Drug use: Not on file    Sexual activity: Not on file   Lifestyle    Physical activity     Days per week: Not on file     Minutes per session: Not on file    Stress: Not on file   Relationships    Social connections      "Talks on phone: Not on file     Gets together: Not on file     Attends Christian service: Not on file     Active member of club or organization: Not on file     Attends meetings of clubs or organizations: Not on file     Relationship status: Not on file   Other Topics Concern    Not on file   Social History Narrative    Not on file       Review of Systems  Review of Systems   Genitourinary: Positive for pelvic pain and vaginal pain.          Objective:   /72   Ht 5' 8" (1.727 m)   Wt 72.1 kg (159 lb)   LMP 12/20/2017 (Exact Date)   BMI 24.18 kg/m²     Physical Exam   Trigone is definitely less tender  Assessment:     1. Chronic trigonitis    2. IC (interstitial cystitis)            Plan:     1. Chronic trigonitis    2. IC (interstitial cystitis)      After exam I had patient presented my office were going to go ahead and move forward to hydro distension which I talked at length arrangements will be be done to do this on the [place] she has specific requirements as her  is returns work after your being out of work she needs the the if November 4th week with a week Total time 15 min greater 50% time 10 min direct discussion  There are no Patient Instructions on file for this visit.        "

## 2020-10-19 NOTE — H&P (VIEW-ONLY)
Subjective:      Patient ID: Therese Rivera is a 35 y.o. female.    Chief Complaint:  No chief complaint on file.      History of Present Illness  No improvement as per patient  She is on hydroxyzine urine bell Myrbetriq and has done 6 installations          History reviewed. No pertinent past medical history.    Past Surgical History:   Procedure Laterality Date    HYSTEROSCOPY      OOPHORECTOMY         GYN & OB History  Patient's last menstrual period was 2017 (exact date).   Date of Last Pap: 2018    OB History    Para Term  AB Living   2 2 0 0 0 0   SAB TAB Ectopic Multiple Live Births   0 0 0 0 2      # Outcome Date GA Lbr Frank/2nd Weight Sex Delivery Anes PTL Lv   2 Para            1 Para                Health Maintenance       Date Due Completion Date    Hepatitis C Screening 1985 ---    Lipid Panel 1985 ---    HIV Screening 2000 ---    TETANUS VACCINE 2003 ---    Influenza Vaccine (1) 2020 ---          Family History   Problem Relation Age of Onset    Cancer Mother     Hypertension Father     COPD Father     Heart disease Father        Social History     Socioeconomic History    Marital status:      Spouse name: Not on file    Number of children: Not on file    Years of education: Not on file    Highest education level: Not on file   Occupational History    Not on file   Social Needs    Financial resource strain: Not on file    Food insecurity     Worry: Not on file     Inability: Not on file    Transportation needs     Medical: Not on file     Non-medical: Not on file   Tobacco Use    Smoking status: Never Smoker    Smokeless tobacco: Never Used   Substance and Sexual Activity    Alcohol use: Not on file    Drug use: Not on file    Sexual activity: Not on file   Lifestyle    Physical activity     Days per week: Not on file     Minutes per session: Not on file    Stress: Not on file   Relationships    Social connections      "Talks on phone: Not on file     Gets together: Not on file     Attends Buddhist service: Not on file     Active member of club or organization: Not on file     Attends meetings of clubs or organizations: Not on file     Relationship status: Not on file   Other Topics Concern    Not on file   Social History Narrative    Not on file       Review of Systems  Review of Systems   Genitourinary: Positive for pelvic pain and vaginal pain.          Objective:   /72   Ht 5' 8" (1.727 m)   Wt 72.1 kg (159 lb)   LMP 12/20/2017 (Exact Date)   BMI 24.18 kg/m²     Physical Exam   Trigone is definitely less tender  Assessment:     1. Chronic trigonitis    2. IC (interstitial cystitis)            Plan:     1. Chronic trigonitis    2. IC (interstitial cystitis)      After exam I had patient presented my office were going to go ahead and move forward to hydro distension which I talked at length arrangements will be be done to do this on the [place] she has specific requirements as her  is returns work after your being out of work she needs the the if November 4th week with a week Total time 15 min greater 50% time 10 min direct discussion  There are no Patient Instructions on file for this visit.        "

## 2020-10-21 DIAGNOSIS — Z01.818 PRE-OP TESTING: Primary | ICD-10-CM

## 2020-10-25 ENCOUNTER — LAB VISIT (OUTPATIENT)
Dept: URGENT CARE | Facility: CLINIC | Age: 35
End: 2020-10-25
Payer: MEDICAID

## 2020-10-25 DIAGNOSIS — Z01.818 PRE-OP TESTING: ICD-10-CM

## 2020-10-25 PROCEDURE — U0003 INFECTIOUS AGENT DETECTION BY NUCLEIC ACID (DNA OR RNA); SEVERE ACUTE RESPIRATORY SYNDROME CORONAVIRUS 2 (SARS-COV-2) (CORONAVIRUS DISEASE [COVID-19]), AMPLIFIED PROBE TECHNIQUE, MAKING USE OF HIGH THROUGHPUT TECHNOLOGIES AS DESCRIBED BY CMS-2020-01-R: HCPCS

## 2020-10-26 LAB — SARS-COV-2 RNA RESP QL NAA+PROBE: NOT DETECTED

## 2020-10-27 ENCOUNTER — TELEPHONE (OUTPATIENT)
Dept: UROGYNECOLOGY | Facility: CLINIC | Age: 35
End: 2020-10-27

## 2020-10-27 NOTE — DISCHARGE INSTRUCTIONS
Cystoscopy    Cystoscopy is a procedure that lets your doctor look directly inside your urethra and bladder. It can be used to:  · Help diagnose a problem with your urethra, bladder, or kidneys.  · Take a sample (biopsy) of bladder or urethral tissue.  · Treat certain problems (such as removing kidney stones).  · Place a stent to bypass an obstruction.  · Take special X-rays of the kidneys.  Based on the findings, your doctor may recommend other tests or treatments.  What is a cystoscope?  A cystoscope is a telescope-like instrument that contains lenses and fiberoptics (small glass wires that make bright light). The cystoscope may be straight and rigid, or flexible to bend around curves in the urethra. The doctor may look directly into the cystoscope, or project the image onto a monitor.  Getting ready  · Ask your doctor if you should stop taking any medicines before the procedure.  · Ask whether you should avoid eating or drinking anything after midnight before the procedure.  · Follow any other instructions your doctor gives you.  Tell your doctor before the exam if you:  · Take any medicines, such as aspirin or blood thinners  · Have allergies to any medicines  · Are pregnant   The procedure  Cystoscopy is done in the doctors office, surgery center, or hospital. The doctor and a nurse are present during the procedure. It takes only a few minutes, longer if a biopsy, X-ray, or treatment needs to be done.  During the procedure:  · You lie on an exam table on your back, knees bent and legs apart. You are covered with a drape.  · Your urethra and the area around it are washed. Anesthetic jelly may be applied to numb the urethra. Other pain medicine is usually not needed. In some cases, you may be offered a mild sedative to help you relax. If a more extensive procedure is to be done, such as a biopsy or kidney stone removal, general anesthesia may be needed.  · The cystoscope is inserted. A sterile fluid is put  into the bladder to expand it. You may feel pressure from this fluid.  · When the procedure is done, the cystoscope is removed.  After the procedure  If you had a sedative, general anesthesia, or spinal anesthesia, you must have someone drive you home. Once youre home:  · Drink plenty of fluids.  · You may have burning or light bleeding when you urinate--this is normal.  · Medicines may be prescribed to ease any discomfort or prevent infection. Take these as directed.  · Call your doctor if you have heavy bleeding or blood clots, burning that lasts more than a day, a fever over 100°F  (38° C), or trouble urinating.    After Surgery:  Always be aware that any surgery can cause these symptoms:    Pain- Medication can be prescribed for pain to decrease your pain but may not completely take your pain away.  Over the Counter pain medicine my be enough and you can always use Ice and rest to help ease pain.    Bleeding- a little bleeding after a surgery is usually within normal.  If there is a lot of blood you need to notify your MD.  Emergency treatments of bleeding are cold application, elevation of the bleeding site and compression.    Infection- Infection after surgery is NOT a normal occurrence.  Signs of infection are fever, swelling, hot to touch the incision.  If this occurs notify your MD immediately.    Nausea- this can be common after a surgery especially if you have had anesthesia medicine or are taking pain medicine.  Staying on clear liquids, bland foods, gingerale, or over the counter anti nausea medicines can help.  If you vomit more than once, notify your MD.  Anti Nausea medicines can be prescribed.    Before leaving, please make sure you have all your personal belongings such as glasses, purses, wallets, keys, cell phones, jewelry, jackets etc

## 2020-10-27 NOTE — TELEPHONE ENCOUNTER
Called and left message that surgery center will call to let her know what time to be there tomorrow and if gets canceled they will call her as well.
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
## 2020-10-29 DIAGNOSIS — Z01.818 PRE-OP TESTING: Primary | ICD-10-CM

## 2020-11-08 ENCOUNTER — LAB VISIT (OUTPATIENT)
Dept: URGENT CARE | Facility: CLINIC | Age: 35
End: 2020-11-08
Payer: MEDICAID

## 2020-11-08 DIAGNOSIS — Z78.9 KNOWN HEALTH PROBLEMS: NONE: ICD-10-CM

## 2020-11-08 DIAGNOSIS — Z01.818 PRE-OP TESTING: ICD-10-CM

## 2020-11-08 LAB — SARS-COV-2 RNA RESP QL NAA+PROBE: NOT DETECTED

## 2020-11-08 PROCEDURE — 99211 PR OFFICE/OUTPT VISIT, EST, LEVL I: ICD-10-PCS | Mod: S$GLB,,, | Performed by: FAMILY MEDICINE

## 2020-11-08 PROCEDURE — U0003 INFECTIOUS AGENT DETECTION BY NUCLEIC ACID (DNA OR RNA); SEVERE ACUTE RESPIRATORY SYNDROME CORONAVIRUS 2 (SARS-COV-2) (CORONAVIRUS DISEASE [COVID-19]), AMPLIFIED PROBE TECHNIQUE, MAKING USE OF HIGH THROUGHPUT TECHNOLOGIES AS DESCRIBED BY CMS-2020-01-R: HCPCS

## 2020-11-08 PROCEDURE — 99211 OFF/OP EST MAY X REQ PHY/QHP: CPT | Mod: S$GLB,,, | Performed by: FAMILY MEDICINE

## 2020-11-10 ENCOUNTER — ANESTHESIA EVENT (OUTPATIENT)
Dept: SURGERY | Facility: AMBULARY SURGERY CENTER | Age: 35
End: 2020-11-10
Payer: MEDICAID

## 2020-11-11 ENCOUNTER — HOSPITAL ENCOUNTER (OUTPATIENT)
Facility: AMBULARY SURGERY CENTER | Age: 35
Discharge: HOME | End: 2020-11-11
Attending: OBSTETRICS & GYNECOLOGY | Admitting: OBSTETRICS & GYNECOLOGY
Payer: MEDICAID

## 2020-11-11 ENCOUNTER — ANESTHESIA (OUTPATIENT)
Dept: SURGERY | Facility: AMBULARY SURGERY CENTER | Age: 35
End: 2020-11-11
Payer: MEDICAID

## 2020-11-11 DIAGNOSIS — N30.10 IC (INTERSTITIAL CYSTITIS): ICD-10-CM

## 2020-11-11 PROCEDURE — 52260 PR CYSTOSCOPY,DIL BLADDER,GEN ANESTH: ICD-10-PCS | Mod: ,,, | Performed by: OBSTETRICS & GYNECOLOGY

## 2020-11-11 PROCEDURE — 52260 CYSTOSCOPY AND TREATMENT: CPT | Performed by: OBSTETRICS & GYNECOLOGY

## 2020-11-11 PROCEDURE — D9220A PRA ANESTHESIA: Mod: CRNA,,, | Performed by: NURSE ANESTHETIST, CERTIFIED REGISTERED

## 2020-11-11 PROCEDURE — D9220A PRA ANESTHESIA: ICD-10-PCS | Mod: ANES,,, | Performed by: ANESTHESIOLOGY

## 2020-11-11 PROCEDURE — 52260 CYSTOSCOPY AND TREATMENT: CPT | Mod: ,,, | Performed by: OBSTETRICS & GYNECOLOGY

## 2020-11-11 PROCEDURE — D9220A PRA ANESTHESIA: ICD-10-PCS | Mod: CRNA,,, | Performed by: NURSE ANESTHETIST, CERTIFIED REGISTERED

## 2020-11-11 PROCEDURE — D9220A PRA ANESTHESIA: Mod: ANES,,, | Performed by: ANESTHESIOLOGY

## 2020-11-11 RX ORDER — HYDROMORPHONE HYDROCHLORIDE 1 MG/ML
0.2 INJECTION, SOLUTION INTRAMUSCULAR; INTRAVENOUS; SUBCUTANEOUS EVERY 5 MIN PRN
Status: DISCONTINUED | OUTPATIENT
Start: 2020-11-11 | End: 2020-11-11 | Stop reason: HOSPADM

## 2020-11-11 RX ORDER — MEPERIDINE HYDROCHLORIDE 25 MG/ML
12.5 INJECTION INTRAMUSCULAR; INTRAVENOUS; SUBCUTANEOUS ONCE AS NEEDED
Status: DISCONTINUED | OUTPATIENT
Start: 2020-11-11 | End: 2020-11-11 | Stop reason: HOSPADM

## 2020-11-11 RX ORDER — LIDOCAINE HYDROCHLORIDE 10 MG/ML
1 INJECTION, SOLUTION EPIDURAL; INFILTRATION; INTRACAUDAL; PERINEURAL ONCE
Status: COMPLETED | OUTPATIENT
Start: 2020-11-11 | End: 2020-11-11

## 2020-11-11 RX ORDER — LIDOCAINE HYDROCHLORIDE 20 MG/ML
INJECTION INTRAVENOUS
Status: DISCONTINUED | OUTPATIENT
Start: 2020-11-11 | End: 2020-11-11

## 2020-11-11 RX ORDER — FENTANYL CITRATE 50 UG/ML
25 INJECTION, SOLUTION INTRAMUSCULAR; INTRAVENOUS EVERY 5 MIN PRN
Status: DISCONTINUED | OUTPATIENT
Start: 2020-11-11 | End: 2020-11-11 | Stop reason: HOSPADM

## 2020-11-11 RX ORDER — ONDANSETRON 2 MG/ML
4 INJECTION INTRAMUSCULAR; INTRAVENOUS ONCE AS NEEDED
Status: DISCONTINUED | OUTPATIENT
Start: 2020-11-11 | End: 2020-11-11 | Stop reason: HOSPADM

## 2020-11-11 RX ORDER — PROPOFOL 10 MG/ML
VIAL (ML) INTRAVENOUS
Status: DISCONTINUED | OUTPATIENT
Start: 2020-11-11 | End: 2020-11-11

## 2020-11-11 RX ORDER — MIDAZOLAM HYDROCHLORIDE 1 MG/ML
INJECTION, SOLUTION INTRAMUSCULAR; INTRAVENOUS
Status: DISCONTINUED | OUTPATIENT
Start: 2020-11-11 | End: 2020-11-11

## 2020-11-11 RX ORDER — FENTANYL CITRATE 50 UG/ML
INJECTION, SOLUTION INTRAMUSCULAR; INTRAVENOUS
Status: DISCONTINUED | OUTPATIENT
Start: 2020-11-11 | End: 2020-11-11

## 2020-11-11 RX ORDER — SODIUM CHLORIDE, SODIUM LACTATE, POTASSIUM CHLORIDE, CALCIUM CHLORIDE 600; 310; 30; 20 MG/100ML; MG/100ML; MG/100ML; MG/100ML
75 INJECTION, SOLUTION INTRAVENOUS CONTINUOUS
Status: DISCONTINUED | OUTPATIENT
Start: 2020-11-11 | End: 2020-11-11 | Stop reason: HOSPADM

## 2020-11-11 RX ORDER — SODIUM CHLORIDE 0.9 % (FLUSH) 0.9 %
3 SYRINGE (ML) INJECTION
Status: DISCONTINUED | OUTPATIENT
Start: 2020-11-11 | End: 2020-11-11 | Stop reason: HOSPADM

## 2020-11-11 RX ORDER — DEXAMETHASONE SODIUM PHOSPHATE 4 MG/ML
INJECTION, SOLUTION INTRA-ARTICULAR; INTRALESIONAL; INTRAMUSCULAR; INTRAVENOUS; SOFT TISSUE
Status: DISCONTINUED | OUTPATIENT
Start: 2020-11-11 | End: 2020-11-11

## 2020-11-11 RX ORDER — OXYCODONE HYDROCHLORIDE 5 MG/1
5 TABLET ORAL
Status: DISCONTINUED | OUTPATIENT
Start: 2020-11-11 | End: 2020-11-11 | Stop reason: HOSPADM

## 2020-11-11 RX ORDER — ONDANSETRON 2 MG/ML
INJECTION INTRAMUSCULAR; INTRAVENOUS
Status: DISCONTINUED | OUTPATIENT
Start: 2020-11-11 | End: 2020-11-11

## 2020-11-11 RX ORDER — LIDOCAINE HYDROCHLORIDE 20 MG/ML
JELLY TOPICAL
Status: DISCONTINUED | OUTPATIENT
Start: 2020-11-11 | End: 2020-11-11 | Stop reason: HOSPADM

## 2020-11-11 RX ORDER — DIPHENHYDRAMINE HYDROCHLORIDE 50 MG/ML
25 INJECTION INTRAMUSCULAR; INTRAVENOUS EVERY 6 HOURS PRN
Status: DISCONTINUED | OUTPATIENT
Start: 2020-11-11 | End: 2020-11-11 | Stop reason: HOSPADM

## 2020-11-11 RX ORDER — SODIUM CHLORIDE, SODIUM LACTATE, POTASSIUM CHLORIDE, CALCIUM CHLORIDE 600; 310; 30; 20 MG/100ML; MG/100ML; MG/100ML; MG/100ML
INJECTION, SOLUTION INTRAVENOUS CONTINUOUS
Status: DISCONTINUED | OUTPATIENT
Start: 2020-11-11 | End: 2020-11-11 | Stop reason: HOSPADM

## 2020-11-11 RX ORDER — KETOROLAC TROMETHAMINE 30 MG/ML
INJECTION, SOLUTION INTRAMUSCULAR; INTRAVENOUS
Status: DISCONTINUED | OUTPATIENT
Start: 2020-11-11 | End: 2020-11-11

## 2020-11-11 RX ADMIN — LIDOCAINE HYDROCHLORIDE 100 MG: 20 INJECTION INTRAVENOUS at 12:11

## 2020-11-11 RX ADMIN — FENTANYL CITRATE 50 MCG: 50 INJECTION, SOLUTION INTRAMUSCULAR; INTRAVENOUS at 11:11

## 2020-11-11 RX ADMIN — MIDAZOLAM HYDROCHLORIDE 2 MG: 1 INJECTION, SOLUTION INTRAMUSCULAR; INTRAVENOUS at 11:11

## 2020-11-11 RX ADMIN — KETOROLAC TROMETHAMINE 30 MG: 30 INJECTION, SOLUTION INTRAMUSCULAR; INTRAVENOUS at 12:11

## 2020-11-11 RX ADMIN — DEXAMETHASONE SODIUM PHOSPHATE 8 MG: 4 INJECTION, SOLUTION INTRA-ARTICULAR; INTRALESIONAL; INTRAMUSCULAR; INTRAVENOUS; SOFT TISSUE at 12:11

## 2020-11-11 RX ADMIN — LIDOCAINE HYDROCHLORIDE 5 MG: 10 INJECTION, SOLUTION EPIDURAL; INFILTRATION; INTRACAUDAL; PERINEURAL at 11:11

## 2020-11-11 RX ADMIN — SODIUM CHLORIDE, SODIUM LACTATE, POTASSIUM CHLORIDE, CALCIUM CHLORIDE: 600; 310; 30; 20 INJECTION, SOLUTION INTRAVENOUS at 11:11

## 2020-11-11 RX ADMIN — ONDANSETRON 4 MG: 2 INJECTION INTRAMUSCULAR; INTRAVENOUS at 12:11

## 2020-11-11 RX ADMIN — Medication 150 MG: at 12:11

## 2020-11-11 NOTE — ANESTHESIA POSTPROCEDURE EVALUATION
Anesthesia Post Evaluation    Patient: Therese Rivera    Procedure(s) Performed: Procedure(s) (LRB):  CYSTOSCOPY, WITH BLADDER HYDRODISTENSION (N/A)    Final Anesthesia Type: general    Patient location during evaluation: PACU  Patient participation: Yes- Able to Participate  Level of consciousness: awake and alert and oriented  Post-procedure vital signs: reviewed and stable  Pain management: adequate  Airway patency: patent    PONV status at discharge: No PONV  Anesthetic complications: no      Cardiovascular status: blood pressure returned to baseline  Respiratory status: unassisted, spontaneous ventilation and room air  Hydration status: euvolemic  Follow-up not needed.          Vitals Value Taken Time   /84 11/11/20 1257   Temp 36.3 °C (97.3 °F) 11/11/20 1240   Pulse 66 11/11/20 1259   Resp 18 11/11/20 1250   SpO2 100 % 11/11/20 1259   Vitals shown include unvalidated device data.      No case tracking events are documented in the log.      Pain/Carin Score: Carin Score: 10 (11/11/2020 12:48 PM)

## 2020-11-11 NOTE — TRANSFER OF CARE
Anesthesia Transfer of Care Note    Patient: Therese Rivera    Procedure(s) Performed: Procedure(s) (LRB):  CYSTOSCOPY, WITH BLADDER HYDRODISTENSION (N/A)    Patient location: PACU    Anesthesia Type: general    Transport from OR: Transported from OR on room air with adequate spontaneous ventilation    Post pain: adequate analgesia    Post assessment: no apparent anesthetic complications and tolerated procedure well    Post vital signs: stable    Level of consciousness: awake and sedated    Nausea/Vomiting: no nausea/vomiting    Complications: none    Transfer of care protocol was followed      Last vitals:   Visit Vitals  /81   Pulse 68   Temp 36.3 °C (97.3 °F)   Resp 17   Wt 72.1 kg (158 lb 15.2 oz)   LMP 12/20/2017 (Exact Date)   SpO2 100%   Breastfeeding No   BMI 24.17 kg/m²

## 2020-11-11 NOTE — ADDENDUM NOTE
Addendum  created 11/11/20 1344 by Jon Calderon MD    Flowsheet accepted, Intraprocedure Event deleted, Intraprocedure Event edited, LDA properties accepted

## 2020-11-11 NOTE — ANESTHESIA PROCEDURE NOTES
Intubation  Performed by: Wayne Edward CRNA  Authorized by: Jon Calderon MD     Intubation:     Induction:  Intravenous    Intubated:  Postinduction    Mask Ventilation:  Easy mask    Attempts:  1    Attempted By:  CRNA    Difficult Airway Encountered?: No      Airway Device:  Supraglottic airway/LMA    Airway Device Size:  3.0    Style/Cuff Inflation:  Cuffed (inflated to minimal occlusive pressure)    Inflation Amount (mL):  10    Secured at:  The lips    Placement Verified By:  Capnometry    Findings Post-Intubation:  Atraumatic/condition of teeth unchanged and BS equal bilateral

## 2020-11-11 NOTE — OP NOTE
Title of Operation:  1) cystourethroscopy  2) hydrodistension of bladder      Indications for Surgery:  Refractory pelvic pain thought to be related to chronic trigonitis  Preoperative Diagnosis:  Chronic trigonitis    Postoperative Diagnosis:  Same    Anesthesia:  General endotracheal anesthesia.  Additionally, 10 mL of 2% viscous lidocaine were injected transurethrally into the bladder for local effect at the close of the case.    Specimen (Bacteriological, Pathological or other):  None.    Prosthetic Device/Implant:  None.    Surgeons Narrative:  Surgeon:  MD Leanna    Assistants:     IV Fluids:   crystalloid mL.    Urine Output:   200 mL per in/out cath at beginning of case.    Estimated Blood Loss:  minimal    Complications:  None.    Sponge, Lap Count:  Verified correct x 2.    Findings:    1)  Exam under anesthesia:  Normal external female genitalia. There were no lesions or lacerations.  The uterus is normal size, anteflexed.  There is minimal decensus of the cervix.  No obvious masses palpated.    2)  On cystoscopy:   Before start of the case, 200 ml of urine were drained from the bladder with a straight catheter.  Using a 70 degree cystoscope, the bladder was then instilled with 300 ml normal saline, and a systematic survey of the bladder was completed from the dome to the base to the urethrovesical junction.  During this survey, most of the bladder mucosa was normal.   Both ureteral orfices were visualized and noted to have good efflux bilaterally.   We then continued to fill the bladder to 400 mL.  After sustaining this 400 mL fill for 5 minutes, we emptied the entire contents of the bladder (450 mL), at which point there was normal bladder.  The same bladder survey was repeated after filling again to 3 had mL.   At this point, there were not areas of glomerulation >10/HPF present in for quadrants of the  bladder x 360 degrees.  There were not no other obvious lesions of masses noted.     After  completion of this final survey, a vaginal finger was used hold pressure against the proximal urethra, and urethroscopy was performed during extraction of the cystoscope.  The urethra appeared Normal, without obvious lesion, mass, or dicerticulum.  The bladder was then drained, with the volume of this contents measuring 450 mL.    Description of the Procedure:    The patient was identified in the preoperative area where consent was confirmed, and she was taken into the operating room where general endotracheal anesthesia was obtained.  She was positioned in candy cane stirrups in high lithotomy position.  Care was taken to avoid joint hyperflexion or hyperextension, and all extremity surfaces were carefully padded so as to minimize the risk of neurologic injury. Intravenous antibiotics were administered preoperatively.  Sequential compression devices as well as ESSIE hose were applied to the patient's lower extremities preoperatively.  A surgical time-out was performed where the patient was identified and procedures confirmed.  An exam under anesthesia was performed with findings as described above.  The patient's perineum and vagina were sterilely prepped and draped.    On cystoscopy:   Before start of the case, 200 ml of urine were drained from the bladder with a straight catheter.  Using a 70 degree cystoscope, the bladder was then instilled with 300 ml normal saline, and a systematic survey of the bladder was completed from the dome to the base to the urethrovesical junction.  During this survey, most of the bladder mucosa was normal.   Both ureteral orfices were visualized and noted to have good efflux bilaterally.   We then continued to fill the bladder to 400 mL.  After sustaining this 400 mL fill for 5 minutes, we emptied the entire contents of the bladder (450 mL), at which point there was normal bladder.  The same bladder survey was repeated after filling again to 3 had mL.   At this point, there were not  areas of glomerulation >10/HPF present in for quadrants of the  bladder x 360 degrees.  There were not no other obvious lesions of masses noted.     After completion of this final survey, a vaginal finger was used hold pressure against the proximal urethra, and urethroscopy was performed during extraction of the cystoscope.  The urethra appeared Normal, without obvious lesion, mass, or dicerticulum.  The bladder was then drained, with the volume of this contents measuring 450 mL.  The patient's legs were taken out of lithotomy, and she was returned to supine position.  All drapes were removed, and she was cleaned.  At the end of the case all counts were correct x 2.  She was awakened from anesthesia, extubated, and taken to the PACU in stable condition.  She tolerated the procedure well.    I was scrubbed and present for the entire procedure. I have reviewed the dictation and agree with the report.

## 2020-11-11 NOTE — DISCHARGE SUMMARY
[facility]  Discharge Note  Short Stay    Procedure(s) (LRB):  CYSTOSCOPY, WITH BLADDER HYDRODISTENSION (N/A)    OUTCOME: Patient tolerated treatment/procedure well without complication and is now ready for discharge.    DISPOSITION: Home or Self Care    FINAL DIAGNOSIS:  <principal problem not specified>    FOLLOWUP: In clinic    DISCHARGE INSTRUCTIONS:  No discharge procedures on file.

## 2020-11-11 NOTE — ANESTHESIA PREPROCEDURE EVALUATION
11/11/2020  Therese Rivera is a 35 y.o., female.    Anesthesia Evaluation    I have reviewed the Patient Summary Reports.    I have reviewed the Nursing Notes. I have reviewed the NPO Status.   I have reviewed the Medications.     Review of Systems  Neurological:   Headaches        Physical Exam  General:  Well nourished    Airway/Jaw/Neck:  Airway Findings: Mouth Opening: Normal Tongue: Normal  General Airway Assessment: Adult, Good  Mallampati: II  Improves to II with phonation.  TM Distance: 4-6 cm      Dental:  Dental Findings: In tact   Chest/Lungs:  Chest/Lungs Findings: Clear to auscultation, Normal Respiratory Rate     Heart/Vascular:  Heart Findings: Rate: Normal  Rhythm: Regular Rhythm  Sounds: Normal  Heart murmur: negative       Mental Status:  Mental Status Findings:  Cooperative, Alert and Oriented         Anesthesia Plan  Type of Anesthesia, risks & benefits discussed:  Anesthesia Type:  general  Patient's Preference:   Intra-op Monitoring Plan: standard ASA monitors  Intra-op Monitoring Plan Comments:   Post Op Pain Control Plan:   Post Op Pain Control Plan Comments:   Induction:   IV  Beta Blocker:  Patient is not currently on a Beta-Blocker (No further documentation required).       Informed Consent: Patient understands risks and agrees with Anesthesia plan.  Questions answered. Anesthesia consent signed with patient.  ASA Score: 1     Day of Surgery Review of History & Physical: I have interviewed and examined the patient. I have reviewed the patient's H&P dated:  There are no significant changes.  H&P update referred to the surgeon.  H&P completed by Anesthesiologist.       Ready For Surgery From Anesthesia Perspective.

## 2020-11-11 NOTE — PLAN OF CARE
Patient spoke with Dr Ram post procedure. She was able to walk to the bathroom. He  is driving her home. She has no additional questions.

## 2020-11-11 NOTE — INTERVAL H&P NOTE
The patient has been examined and the H&P has been reviewed:    I concur with the findings and no changes have occurred since H&P was written.    Surgery risks, benefits and alternative options discussed and understood by patient/family.          Active Hospital Problems    Diagnosis  POA    IC (interstitial cystitis) [N30.10]  Yes      Resolved Hospital Problems   No resolved problems to display.

## 2020-11-15 ENCOUNTER — OFFICE VISIT (OUTPATIENT)
Dept: URGENT CARE | Facility: CLINIC | Age: 35
End: 2020-11-15
Payer: MEDICAID

## 2020-11-15 VITALS
OXYGEN SATURATION: 98 % | HEIGHT: 68 IN | TEMPERATURE: 99 F | DIASTOLIC BLOOD PRESSURE: 82 MMHG | HEART RATE: 75 BPM | RESPIRATION RATE: 16 BRPM | WEIGHT: 157 LBS | BODY MASS INDEX: 23.79 KG/M2 | SYSTOLIC BLOOD PRESSURE: 124 MMHG

## 2020-11-15 DIAGNOSIS — B96.89 ACUTE BACTERIAL SINUSITIS: Primary | ICD-10-CM

## 2020-11-15 DIAGNOSIS — H65.01 RIGHT ACUTE SEROUS OTITIS MEDIA, RECURRENCE NOT SPECIFIED: ICD-10-CM

## 2020-11-15 DIAGNOSIS — R05.9 COUGH: ICD-10-CM

## 2020-11-15 DIAGNOSIS — J01.90 ACUTE BACTERIAL SINUSITIS: Primary | ICD-10-CM

## 2020-11-15 LAB
CTP QC/QA: YES
SARS-COV-2 RDRP RESP QL NAA+PROBE: NEGATIVE

## 2020-11-15 PROCEDURE — U0002 COVID-19 LAB TEST NON-CDC: HCPCS | Mod: QW,S$GLB,, | Performed by: NURSE PRACTITIONER

## 2020-11-15 PROCEDURE — U0002: ICD-10-PCS | Mod: QW,S$GLB,, | Performed by: NURSE PRACTITIONER

## 2020-11-15 PROCEDURE — 99214 PR OFFICE/OUTPT VISIT, EST, LEVL IV, 30-39 MIN: ICD-10-PCS | Mod: S$GLB,,, | Performed by: NURSE PRACTITIONER

## 2020-11-15 PROCEDURE — 99214 OFFICE O/P EST MOD 30 MIN: CPT | Mod: S$GLB,,, | Performed by: NURSE PRACTITIONER

## 2020-11-15 RX ORDER — PREDNISONE 10 MG/1
10 TABLET ORAL DAILY
Qty: 4 TABLET | Refills: 0 | Status: SHIPPED | OUTPATIENT
Start: 2020-11-15 | End: 2020-11-19

## 2020-11-15 RX ORDER — AZELASTINE 1 MG/ML
1 SPRAY, METERED NASAL 2 TIMES DAILY
Qty: 30 ML | Refills: 0 | Status: SHIPPED | OUTPATIENT
Start: 2020-11-15

## 2020-11-15 RX ORDER — AMOXICILLIN 875 MG/1
875 TABLET, FILM COATED ORAL EVERY 12 HOURS
Qty: 20 TABLET | Refills: 0 | Status: SHIPPED | OUTPATIENT
Start: 2020-11-15 | End: 2020-11-25

## 2020-11-15 NOTE — PATIENT INSTRUCTIONS
· You received a steroid oral script today - this can elevate your blood pressure, elevate your blood sugar, water weight gain, nervous energy, redness to the face and if given as an injection can cause dimpling of the skin where the shot goes in. If you are a diabetic, you should monitor your blood sugar three times daily for the next 4 days AND as needed for any signs of hyperglycemia. Go to nearest Emergency Department for any blood sugar over 300. There is a very small risk of osteonecrosis at site of injection but this is extremely rare and you should be re-evaluated if increasing pain, redness, swelling, or warmth experienced at site of injection.   ·   1.  Take all antibiotics as prescribed.  It is imperative that once you start them, you take them to completion unless otherwise directed.  You should not have left over antibiotics.     2.  For patients above 6 months of age who are not allergic to and are not on anticoagulants, you can alternate Tylenol and Motrin every 4-6 hours for fever above 100.4F and/or pain.  For patients less than 6 months of age, allergic to or intolerant to NSAIDS, have gastritis, gastric ulcers, or history of GI bleeds, are pregnant, or are on anticoagulant therapy, you can take Tylenol every 4 hours as needed for fever above 100.4F and/or pain.     3.  Rest and keep yourself well hydrated.  Drink hot liquids (coffee, water, tea, hot chocolate, or soup) 10-12 times a day for 5-7 days.  Put liquid in a mug and place in microwave for 2.5 - 3 minutes. Pour hot liquid into another mug not used to microwave the liquid (to avoid burning your mouth) then sniff the steam from the cup and sip the heated liquid.    4.  You can use these over the counter medications/remedies to help with your symptoms:     Runny Nose:  Use an antihistamine such as Claritin, Zyrtec or Allegra to help dry you out.     Congestion:  Use pseudoephedrine (behind the counter) for congestion- Pseudoephedrine 30 mg up  to 120 mg /day. Warning:  It can raise your blood pressure and give you palpitations.  Coricidin HBP is okay to use if you have high blood pressure.     Use mucinex (guaifenisin) up to 2400mg/day to break up/loosen any mucous. MucinexDM has a cough suppressant that can be used for cough and at night to stop the tickle in the back of your throat.    Use Nasal Saline to mechanically move any post nasal drip from your eustachian tubes or from the back of your throat.      Use Flonase or astelin 1-2 sprays/nostril per day. It is a local acting steroid nasal spray.  If you develop a bloody nose, stop using the medication immediately.    Sore throat:  Use warm, salt water gargles to ease your throat pain- 1/2 tsp salt to 1 cup warm water, gargle as desired.  Chloraseptic sprays and throat lozenges will also help to ease throat pain.     Sometimes Nyquil at night is beneficial to help you get some rest; however, it is sedating and does contain an antihistamine and Tylenol.  Make sure not to double up on these medications. Additionally you should not take this if you have high blood pressure.       These things will help you to feel better and will speed your recovery.  If your condition fails to improve in a timely manner, you should receive another evaluation by your Primary Care Provider/Pediatrician to discuss your concerns or return to urgent care for a recheck.  If your condition worsens at any time, you should report immediately to your nearest Emergency Department for further evaluation. **You must understand that you have received Urgent Care treatment only and that you may be released before all of your medical problems are known or treated. You, the patient, are responsible to arrange for follow-up care as instructed.     ·   ·   ·   · Follow up with your primary care in 2-5 days if symptoms have not improved, or you may return here.  · If you were referred to a specialist, please follow up with that  specialty.  · If you were prescribed antibiotics, please take them to completion.  · If you were prescribed a narcotic or any medication with sedative effects, do not drive or operate heavy equipment or machinery while taking these medications.  · You must understand that you have received treatment at an Urgent Care facility only, and that you may be released before all of your medical problems are known or treated. Urgent Care facilities are not equipped to handle life threatening emergencies. It is recommended that you go to an Emergency Department for further evaluation of worsening or concerning symptoms, or possibly life threatening conditions as discussed.                                        If you  smoke, please stop smoking

## 2020-11-15 NOTE — PROGRESS NOTES
"Subjective:       Patient ID: Therese Rivera is a 35 y.o. female.    Vitals:  height is 5' 8" (1.727 m) and weight is 71.2 kg (157 lb). Her temperature is 98.6 °F (37 °C). Her blood pressure is 124/82 and her pulse is 75. Her respiration is 16 and oxygen saturation is 98%.     Chief Complaint: Sore Throat (cough, sinus)    Negative covid test last week prior procedure, denies post op concerns for bladder surgical intervention. 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    Sore Throat   This is a new problem. The current episode started in the past 7 days. The problem has been gradually worsening. Neither side of throat is experiencing more pain than the other. There has been no fever. The pain is moderate. Associated symptoms include congestion, coughing, headaches (frontal, nonradiating, mild and intermittent) and swollen glands. Pertinent negatives include no abdominal pain, diarrhea, drooling, ear discharge, ear pain, hoarse voice, plugged ear sensation, neck pain, shortness of breath, stridor, trouble swallowing or vomiting. She has had no exposure to strep or mono. Treatments tried: mucinex. The treatment provided no relief.       Constitution: Negative for chills, sweating, fatigue, fever and generalized weakness.   HENT: Positive for congestion, postnasal drip and sore throat. Negative for ear pain, ear discharge, drooling, sinus pain, sinus pressure, trouble swallowing and voice change.    Neck: Positive for painful lymph nodes (left side). Negative for neck pain and neck stiffness.   Cardiovascular: Negative for chest pain and sob on exertion.   Eyes: Negative for eye discharge, eye itching and eye redness.   Respiratory: Positive for cough. Negative for chest tightness, sputum production, bloody sputum, COPD, shortness of breath, stridor, wheezing and asthma.    Gastrointestinal: Negative for abdominal pain, nausea, vomiting, constipation and diarrhea.   Genitourinary: Negative for " dysuria, frequency, urgency, urine decreased, flank pain, hematuria and history of kidney stones.        Pt with IC, no acute changes from baseline.    Musculoskeletal: Negative for joint pain, joint swelling and muscle ache.   Skin: Negative for pale, rash, lesion and erythema.   Allergic/Immunologic: Positive for seasonal allergies and immunizations up-to-date. Negative for environmental allergies, food allergies, asthma and flu shot.   Neurological: Positive for headaches (frontal, nonradiating, mild and intermittent).   Hematologic/Lymphatic: Positive for swollen lymph nodes (left side). Negative for easy bruising/bleeding. Does not bruise/bleed easily.       Objective:      Physical Exam   Constitutional: She is oriented to person, place, and time. She appears well-developed. She is cooperative.  Non-toxic appearance. She does not appear ill. No distress. normal and well-groomed  HENT:   Head: Normocephalic and atraumatic.   Ears:   Right Ear: Hearing, external ear and ear canal normal. No drainage. No mastoid tenderness. Tympanic membrane is erythematous and bulging. A middle ear effusion is present.   Left Ear: Hearing, external ear and ear canal normal. No drainage. No mastoid tenderness. Tympanic membrane is not erythematous and not bulging. A middle ear effusion is present.   Nose: Mucosal edema present. No rhinorrhea or nasal deformity. No epistaxis. Right sinus exhibits frontal sinus tenderness. Right sinus exhibits no maxillary sinus tenderness. Left sinus exhibits frontal sinus tenderness. Left sinus exhibits no maxillary sinus tenderness.   Mouth/Throat: Uvula is midline and mucous membranes are normal. Mucous membranes are not pale. No trismus in the jaw. Normal dentition. No uvula swelling. Posterior oropharyngeal erythema (with purulent post nasal drainage) present. No oropharyngeal exudate, posterior oropharyngeal edema, tonsillar abscesses or cobblestoning. Tonsils are 0 on the right. Tonsils are  0 on the left. No tonsillar exudate.   Nasal friability bilaterally.      Comments: Nasal friability bilaterally.  Eyes: Conjunctivae and lids are normal. No scleral icterus.   Neck: Trachea normal, normal range of motion, full passive range of motion without pain and phonation normal. Neck supple. No muscular tenderness present. No neck rigidity. No edema and no erythema present.   Cardiovascular: Normal rate, regular rhythm, normal heart sounds and normal pulses.   No murmur heard.  Pulmonary/Chest: Effort normal and breath sounds normal. No stridor. No respiratory distress. She has no decreased breath sounds. She has no wheezes. She has no rhonchi. She has no rales.    Comments: Normal RR and RA sats. Speaking full sentences without difficulty.  No notable coughing at present.  No evidence SOB/SETH.    Abdominal: Soft. Normal appearance. She exhibits no distension. There is no abdominal tenderness. There is no guarding.   Musculoskeletal: Normal range of motion.         General: No deformity.   Lymphadenopathy:     She has cervical adenopathy.   Neurological: She is alert and oriented to person, place, and time. She exhibits normal muscle tone. Coordination and gait normal.   Skin: Skin is warm, dry, intact, not diaphoretic, not pale and no rash. erythemajaundicePsychiatric: Her speech is normal and behavior is normal. Mood, judgment and thought content normal.   Nursing note and vitals reviewed.        Assessment:       1. Acute bacterial sinusitis    2. Cough    3. Right acute serous otitis media, recurrence not specified        Plan:     Alert, nontoxic and in NAD. Afebrile.  Patient with no evidence of respiratory distress.  Patient with sinusitis and OM symptoms.  Will test for COVID, negative,reviewed with pt.  Advised on COVID testing, signs and symptoms of COVID, symptomatic management at home, signs and symptoms to seek emergency care, CDC quarantine guidelines for COVID.  Patient verbalized understanding  and agreement treatment plan.      Acute bacterial sinusitis  -     predniSONE (DELTASONE) 10 MG tablet; Take 1 tablet (10 mg total) by mouth once daily. for 4 days  Dispense: 4 tablet; Refill: 0  -     azelastine (ASTELIN) 137 mcg (0.1 %) nasal spray; 1 spray (137 mcg total) by Nasal route 2 (two) times daily.  Dispense: 30 mL; Refill: 0  -     amoxicillin (AMOXIL) 875 MG tablet; Take 1 tablet (875 mg total) by mouth every 12 (twelve) hours. for 10 days  Dispense: 20 tablet; Refill: 0    Cough  -     POCT COVID-19 Rapid Screening    Right acute serous otitis media, recurrence not specified  -     predniSONE (DELTASONE) 10 MG tablet; Take 1 tablet (10 mg total) by mouth once daily. for 4 days  Dispense: 4 tablet; Refill: 0  -     azelastine (ASTELIN) 137 mcg (0.1 %) nasal spray; 1 spray (137 mcg total) by Nasal route 2 (two) times daily.  Dispense: 30 mL; Refill: 0          Office Visit on 11/15/2020   Component Date Value Ref Range Status    POC Rapid COVID 11/15/2020 Negative  Negative Final     Acceptable 11/15/2020 Yes   Final       Patient Instructions   · You received a steroid oral script today - this can elevate your blood pressure, elevate your blood sugar, water weight gain, nervous energy, redness to the face and if given as an injection can cause dimpling of the skin where the shot goes in. If you are a diabetic, you should monitor your blood sugar three times daily for the next 4 days AND as needed for any signs of hyperglycemia. Go to nearest Emergency Department for any blood sugar over 300. There is a very small risk of osteonecrosis at site of injection but this is extremely rare and you should be re-evaluated if increasing pain, redness, swelling, or warmth experienced at site of injection.   ·   1.  Take all antibiotics as prescribed.  It is imperative that once you start them, you take them to completion unless otherwise directed.  You should not have left over antibiotics.      2.  For patients above 6 months of age who are not allergic to and are not on anticoagulants, you can alternate Tylenol and Motrin every 4-6 hours for fever above 100.4F and/or pain.  For patients less than 6 months of age, allergic to or intolerant to NSAIDS, have gastritis, gastric ulcers, or history of GI bleeds, are pregnant, or are on anticoagulant therapy, you can take Tylenol every 4 hours as needed for fever above 100.4F and/or pain.     3.  Rest and keep yourself well hydrated.  Drink hot liquids (coffee, water, tea, hot chocolate, or soup) 10-12 times a day for 5-7 days.  Put liquid in a mug and place in microwave for 2.5 - 3 minutes. Pour hot liquid into another mug not used to microwave the liquid (to avoid burning your mouth) then sniff the steam from the cup and sip the heated liquid.    4.  You can use these over the counter medications/remedies to help with your symptoms:     Runny Nose:  Use an antihistamine such as Claritin, Zyrtec or Allegra to help dry you out.     Congestion:  Use pseudoephedrine (behind the counter) for congestion- Pseudoephedrine 30 mg up to 120 mg /day. Warning:  It can raise your blood pressure and give you palpitations.  Coricidin HBP is okay to use if you have high blood pressure.     Use mucinex (guaifenisin) up to 2400mg/day to break up/loosen any mucous. MucinexDM has a cough suppressant that can be used for cough and at night to stop the tickle in the back of your throat.    Use Nasal Saline to mechanically move any post nasal drip from your eustachian tubes or from the back of your throat.      Use Flonase or astelin 1-2 sprays/nostril per day. It is a local acting steroid nasal spray.  If you develop a bloody nose, stop using the medication immediately.    Sore throat:  Use warm, salt water gargles to ease your throat pain- 1/2 tsp salt to 1 cup warm water, gargle as desired.  Chloraseptic sprays and throat lozenges will also help to ease throat pain.      Sometimes Nyquil at night is beneficial to help you get some rest; however, it is sedating and does contain an antihistamine and Tylenol.  Make sure not to double up on these medications. Additionally you should not take this if you have high blood pressure.       These things will help you to feel better and will speed your recovery.  If your condition fails to improve in a timely manner, you should receive another evaluation by your Primary Care Provider/Pediatrician to discuss your concerns or return to urgent care for a recheck.  If your condition worsens at any time, you should report immediately to your nearest Emergency Department for further evaluation. **You must understand that you have received Urgent Care treatment only and that you may be released before all of your medical problems are known or treated. You, the patient, are responsible to arrange for follow-up care as instructed.     ·   ·   ·   · Follow up with your primary care in 2-5 days if symptoms have not improved, or you may return here.  · If you were referred to a specialist, please follow up with that specialty.  · If you were prescribed antibiotics, please take them to completion.  · If you were prescribed a narcotic or any medication with sedative effects, do not drive or operate heavy equipment or machinery while taking these medications.  · You must understand that you have received treatment at an Urgent Care facility only, and that you may be released before all of your medical problems are known or treated. Urgent Care facilities are not equipped to handle life threatening emergencies. It is recommended that you go to an Emergency Department for further evaluation of worsening or concerning symptoms, or possibly life threatening conditions as discussed.                                        If you  smoke, please stop smoking

## 2020-11-17 VITALS
DIASTOLIC BLOOD PRESSURE: 85 MMHG | SYSTOLIC BLOOD PRESSURE: 118 MMHG | RESPIRATION RATE: 18 BRPM | OXYGEN SATURATION: 99 % | HEART RATE: 55 BPM | WEIGHT: 158.94 LBS | TEMPERATURE: 97 F | BODY MASS INDEX: 24.17 KG/M2

## 2020-11-30 ENCOUNTER — OFFICE VISIT (OUTPATIENT)
Dept: UROGYNECOLOGY | Facility: CLINIC | Age: 35
End: 2020-11-30
Payer: MEDICAID

## 2020-11-30 VITALS
DIASTOLIC BLOOD PRESSURE: 70 MMHG | SYSTOLIC BLOOD PRESSURE: 118 MMHG | HEIGHT: 68 IN | WEIGHT: 154.31 LBS | BODY MASS INDEX: 23.39 KG/M2

## 2020-11-30 DIAGNOSIS — N32.81 OAB (OVERACTIVE BLADDER): ICD-10-CM

## 2020-11-30 DIAGNOSIS — N30.10 IC (INTERSTITIAL CYSTITIS): Primary | ICD-10-CM

## 2020-11-30 DIAGNOSIS — N30.30 CHRONIC TRIGONITIS: ICD-10-CM

## 2020-11-30 DIAGNOSIS — R39.89 BLADDER PAIN: ICD-10-CM

## 2020-11-30 PROCEDURE — 99213 PR OFFICE/OUTPT VISIT, EST, LEVL III, 20-29 MIN: ICD-10-PCS | Mod: S$PBB,,, | Performed by: OBSTETRICS & GYNECOLOGY

## 2020-11-30 PROCEDURE — 99213 OFFICE O/P EST LOW 20 MIN: CPT | Mod: PBBFAC | Performed by: OBSTETRICS & GYNECOLOGY

## 2020-11-30 PROCEDURE — 99999 PR PBB SHADOW E&M-EST. PATIENT-LVL III: CPT | Mod: PBBFAC,,, | Performed by: OBSTETRICS & GYNECOLOGY

## 2020-11-30 PROCEDURE — 99213 OFFICE O/P EST LOW 20 MIN: CPT | Mod: S$PBB,,, | Performed by: OBSTETRICS & GYNECOLOGY

## 2020-11-30 PROCEDURE — 99999 PR PBB SHADOW E&M-EST. PATIENT-LVL III: ICD-10-PCS | Mod: PBBFAC,,, | Performed by: OBSTETRICS & GYNECOLOGY

## 2020-11-30 NOTE — PROGRESS NOTES
Subjective:      Patient ID: Therese Rivera is a 35 y.o. female.    Chief Complaint:  Follow-up      History of Present Illness  Minimal improvement        Despite being on hydroxyzine, Urogesic times for a day, Myrbetriq  Six bladder installations with hydro distension    Will need to send patient for higher level of care          Past Medical History:   Diagnosis Date    Interstitial cystitis     Migraines        Past Surgical History:   Procedure Laterality Date    CYSTOSCOPY WITH HYDRODISTENSION OF BLADDER N/A 2020    Procedure: CYSTOSCOPY, WITH BLADDER HYDRODISTENSION;  Surgeon: Natanael Ram MD;  Location: [facility];  Service: OB/GYN;  Laterality: N/A;    HYSTERECTOMY      OOPHORECTOMY      left removed       GYN & OB History  Patient's last menstrual period was 2017 (exact date).   Date of Last Pap: No result found    OB History    Para Term  AB Living   2 2 0 0 0 0   SAB TAB Ectopic Multiple Live Births   0 0 0 0 2      # Outcome Date GA Lbr Frank/2nd Weight Sex Delivery Anes PTL Lv   2 Para            1 Para                Health Maintenance       Date Due Completion Date    Hepatitis C Screening 1985 ---    Lipid Panel 1985 ---    HIV Screening 2000 ---    TETANUS VACCINE 2003 ---    Influenza Vaccine (1) 2020 ---          Family History   Problem Relation Age of Onset    Cancer Mother     Hypertension Father     COPD Father     Heart disease Father        Social History     Socioeconomic History    Marital status:      Spouse name: Not on file    Number of children: Not on file    Years of education: Not on file    Highest education level: Not on file   Occupational History    Not on file   Social Needs    Financial resource strain: Not on file    Food insecurity     Worry: Not on file     Inability: Not on file    Transportation needs     Medical: Not on file     Non-medical: Not on file   Tobacco Use    Smoking status:  "Never Smoker    Smokeless tobacco: Never Used   Substance and Sexual Activity    Alcohol use: Never     Frequency: Never    Drug use: Not on file    Sexual activity: Not on file   Lifestyle    Physical activity     Days per week: Not on file     Minutes per session: Not on file    Stress: Not on file   Relationships    Social connections     Talks on phone: Not on file     Gets together: Not on file     Attends Tenri[employer] service: Not on file     Active member of club or organization: Not on file     Attends meetings of clubs or organizations: Not on file     Relationship status: Not on file   Other Topics Concern    Not on file   Social History Narrative    Not on file       Review of Systems  Review of Systems   Genitourinary: Positive for pelvic pain and urgency.          Objective:   /70   Ht 5' 8" (1.727 m)   Wt 70 kg (154 lb 5.2 oz)   LMP 12/20/2017 (Exact Date)   BMI 23.46 kg/m²     Physical Exam   Minimal improvement  Assessment:     1. IC (interstitial cystitis)    2. OAB (overactive bladder)    3. Chronic trigonitis    4. Bladder pain            Plan:     1. IC (interstitial cystitis)    2. OAB (overactive bladder)    3. Chronic trigonitis    4. Bladder pain      Patient will benefit from a higher level of the IC management will refer to Urology patient understanding  There are no Patient Instructions on file for this visit.        "

## 2020-12-04 NOTE — PROGRESS NOTES
Bladder installation 3/6  A #14 red rubber cath inserted into bladder using sterile technique. The urethral meatus was prepped with betadine prior to cath insertion x 2.  70 cc clear yellow urine drained from bladder. A solution of RMSO 50 mLs, 1% bupivacaine 10 mL, heparin 10,000 IU/ml 10 mL, kenalog 40 mg, then and sodium bicarb 4.2% (2.5 mEq) was instilled into bladder without difficulty. Procedure was tolerated very well. She was instructed to hold urine for one hour before voiding.   Physical Therapy  Visit Type: treatment  Precautions:  Medical precautions:  fall risk; standard precautions.  Therapist wearing gloves, goggles and mask during session.    Patient was wearing mask throughout duration of therapy session.   Lines:     Basic: O2 and telemetry      Lines in chart and on patient reviewed, cautions maintained throughout session.  Safety Measures: chair alarm, bed alarm and bed rails      SUBJECTIVE                                                                                                            Patient agreed to participate in therapy this date.  \"Thanks for letting me sleep this morning, doll.  I needed it.\"  Patient / Family Goal: return to previous functional status and return home    Pain   RN informed on pain level      OBJECTIVE                                                                                                                Oriented to person, place and time     Arousal alertness: appropriate responses to stimuli    Affect/Behavior: alert, appropriate, calm and cooperative  Patient activity tolerance: 1 to 1 activity to rest  Functional Communication/Cognition    Overall status:  Within functional limits    Form of communication:  Verbal   Attention span:  Appears intact    Commands: follows all commands and directions consistently.    Transition between tasks: transitions tasks without difficulty.    Safety judgement: good awareness of safety precautions.    Awareness of deficits: fully aware of deficits.  Bed Mobility:      Supine to sit: moderate assist and with verbal cues  Training completed:    Tasks: supine to sit    Education details: patient safety and patient requires additional training  Transfers:    Assistive devices: 2-wheeled walker, 1 person and gait belt    Sit to stand: contact guard/touching/steadying assist and with verbal cues    Stand to sit: contact guard/touching/steadying assist and with verbal cues  Training completed:      Education  details: patient safety and patient requires additional training  Gait/Ambulation:     Assistance: contact guard/touching/steadying assist (SBA)   Assistive device: 1 person, gait belt and 2-wheeled walker    Distance (ft): 300    Type: decreased benito  Training Completed:    Tasks: gait training on level surfaces    Education details: patient safety and patient requires additional training    Gait slow but steady with RW at this time.  Did 2 laps around unit.        Interventions                                                                                                       Seated    Lower Extremity: Bilateral: knee flexion, seated hip flexion, toe raises, heel raises and knee extensions, AROM, 10 reps, 1 sets        ASSESSMENT                                                                                                                Impairments: pain  Functional Limitations: all functional mobility     Discharge Recommendations   Recommendation for Discharge: PT IL: Patient requires 24 hour nonskilled assistance to perform mobility and/or ADLs safely        PT/OT Mobility Equipment for Discharge: owns RW          Skilled therapy is required to address these limitations in attempt to maximize the patient's independence.  Progress: improving as expected    End of Session:   Location: in chair  Safety measures: alarm system in place/re-engaged, call light within reach and equipment intact  Handoff to: nurse            PLAN                                                                                                                            Suggestions for next session as indicated: PT Frequency: 3 days/week  Frequency Comments: 2/3 home 12.3 (1 or 2 more sessions for bed mobility)    Interventions: bed mobility, safety education, gait training, functional transfer training and ROM  Agreement to plan and goals: patient agrees with goals and treatment plan        GOALS:  Review Date: 12/4/2020  Long Term  Goals: (to be met by time of discharge from hospital)  Sit to supine: Patient will complete sit to supine supervision (log roll technique).  Status: progressing/ongoing  Supine to sit: Patient will complete supine to sit supervision (log roll technique).  Status: progressing/ongoing  Sit to stand: Patient will complete sit to stand transfer with modified independent.  Status: progressing/ongoing  Stand to sit: Patient will complete stand to sit transfer with modified independent.  Status: progressing/ongoing  Ambulation (even): Patient will ambulate on even surface for 150 feet with 2-wheeled walker, modified independent.   Status: progressing/ongoing    Documented in the chart in the following areas: Pain. Assessment.

## 2020-12-14 ENCOUNTER — OFFICE VISIT (OUTPATIENT)
Dept: UROLOGY | Facility: CLINIC | Age: 35
End: 2020-12-14
Payer: MEDICAID

## 2020-12-14 VITALS
HEIGHT: 68 IN | BODY MASS INDEX: 24.25 KG/M2 | WEIGHT: 160 LBS | SYSTOLIC BLOOD PRESSURE: 131 MMHG | DIASTOLIC BLOOD PRESSURE: 92 MMHG | HEART RATE: 88 BPM

## 2020-12-14 DIAGNOSIS — R39.89 BLADDER PAIN: ICD-10-CM

## 2020-12-14 DIAGNOSIS — N32.81 OAB (OVERACTIVE BLADDER): ICD-10-CM

## 2020-12-14 DIAGNOSIS — N30.10 IC (INTERSTITIAL CYSTITIS): ICD-10-CM

## 2020-12-14 PROCEDURE — 99999 PR PBB SHADOW E&M-EST. PATIENT-LVL III: CPT | Mod: PBBFAC,,, | Performed by: UROLOGY

## 2020-12-14 PROCEDURE — 99204 PR OFFICE/OUTPT VISIT, NEW, LEVL IV, 45-59 MIN: ICD-10-PCS | Mod: S$PBB,,, | Performed by: UROLOGY

## 2020-12-14 PROCEDURE — 99204 OFFICE O/P NEW MOD 45 MIN: CPT | Mod: S$PBB,,, | Performed by: UROLOGY

## 2020-12-14 PROCEDURE — 99999 PR PBB SHADOW E&M-EST. PATIENT-LVL III: ICD-10-PCS | Mod: PBBFAC,,, | Performed by: UROLOGY

## 2020-12-14 PROCEDURE — 99213 OFFICE O/P EST LOW 20 MIN: CPT | Mod: PBBFAC | Performed by: UROLOGY

## 2020-12-14 NOTE — LETTER
December 14, 2020      Natanael Ram MD  [address] [place] [address] Drive  [address]  [facility] [address]           [facility] 4th Fl  [address]  [facility] [address]  Phone: [phone]          Patient: Therese Rivera   MR Number: [id number]   YOB: 1985   Date of Visit: 12/14/2020       Dear Dr. Natanael Ram:    Thank you for referring Therese Rivera to me for evaluation. Attached you will find relevant portions of my assessment and plan of care.    If you have questions, please do not hesitate to call me. I look forward to following Therese Rivera along with you.    Sincerely,    Sawyer Sol MD    Enclosure  CC:  No Recipients    If you would like to receive this communication electronically, please contact [email or web address] or [phone] to request more information on [facility] access.    For providers and/or their staff who would like to refer a patient to [facility], please contact us through our one-stop-shop provider referral line, Kelsea Benitez, at [phone].    If you feel you have received this communication in error or would no longer like to receive these types of communications, please e-mail [email or web address]

## 2020-12-14 NOTE — PROGRESS NOTES
[facility]      Bladder Pain New Note    12/14/2020    Referred by:  Natanael Ram MD    HPI: Therese Rivera is a very pleasant 35 y.o. female who is a new patient to our department referred for evaluation of suspected bladder pain of 2-3 years duration. She reports that pain is related to her voiding cycle and results in urinary frequency. This pattern began in March 2020, after about 6 months of resolution. She now notes that frequency and pain related to her voiding cycle has subsided. She continues hydroxyzine for now.    However, she continues with pelvic pain unrelated to her voiding cycle. She is not voiding frequently but associates pain with exercise and intercourse.     She denies symptoms of obstructive voiding including decreased stream, hesitancy, intermittency, post void dribbling and sense of incomplete emptying. Bladder scan PVR was 0 mL.  Her history includes prior pelvic surgery (hysterectomy for benign disease in 2018 -  When pelvic pain first started) but excludes a history of urolithiasis, hematuria, neurological symptoms/diagnoses, incontinence procedures and prolapse surgeries. She denies all other prior pelvic surgeries or neurologic diagnoses. She does not report symptoms suggestive of advanced POP.     Her medical history is significant for {endometriosis.     Previous therapies for these symptoms have included:    dietary modification: no improvement   intravesical instillations: - unsure of cocktail  (provided no benefit)   hydroxyzine:  Some improvement   cystoscopy with hydrodistention: provided some but insufficient benefit     Previous evaluation has included:    Cystoscopy: No ulcerations noted    A review of 10+ systems was conducted with pertinent positive and negative findings documented in HPI with all other systems reviewed and negative.    Past medical, family, surgical and social history reviewed as documented in chart with pertinent positive medical,  family, surgical and social history detailed in HPI.    Exam Findings:    Const: no acute distress, conversant and alert  Eyes: anicteric, extraocular muscles intact  ENMT: normocephalic, Nl oral membranes  Cardio: no cyanosis, nl cap refill  Pulm: no tachypnea; no resp distress  Abd: soft, no tenderness  Musc: no laceration, no tenderness  Neuro: alert; oriented x 3  Skin: warm, dry; no petichiae  Psych: no anxiety; normal speech     Assessment/Plan:    Bladder Pain (new, addt'l workup): She reports reports pelvic pain, pressure and discomfort that appears associated with the voiding cycle.  Urinary frequency is largely driven by pain and discomfort rather than by a desire to avoid incontinence.  The history suggests the likelihood of Bladder Pain Syndrome/InterstitialCystitis (BPS/IC) as the most likely diagnosis.  There are no clearly obvious competing etiologies after an initial evaluation, though other diagnoses will continue to be considered, particularly if symptoms do not respond to initial therapy.  We had a thorough discussion today about the nature of BPS/IC including normal bladder function, the nature of various treatment options, that no single treatment is effective in a majority of patients, and that acceptable symptom control may require trials of several therapies or combinations of therapies.  Additional educational resources were provided including printed materials and directions to the [employer] Association ([employer]) and the Interstitial Cystitis Association online resources.     She appears to have spontaneous remission of IC/BPS symptoms for now, though they are likely to return at some point in the future. She may try to discontinue the hydroxyzine if she would like.     Pelvic Pain and Pelvic Floor Dysfunction (new, addt'l workup): She continues with considerable pelvic pain related to exercise and intercourse, suggestive of chronic pelvic pain and pelvic floor dysfunction,  which is independent of her voiding cycle. I am recommending that she meet with a pelvic floor physical therapist for evaluation and assistance. I will meet back with her in 6-8 weeks for evaluation of her progress.        Review of prior external note(s): outside records reviewed today and summarized in HPI   Review of test results: urinalysis (12/14/2020) U/S for post void residual (12/14/2020)   Tests Ordered: none   Radiology independently reviewed: none   Discussion with external physician/health care professional: No discussion with external provider

## 2021-01-05 ENCOUNTER — PATIENT MESSAGE (OUTPATIENT)
Dept: UROLOGY | Facility: CLINIC | Age: 36
End: 2021-01-05

## 2021-01-11 ENCOUNTER — TELEPHONE (OUTPATIENT)
Dept: UROLOGY | Facility: CLINIC | Age: 36
End: 2021-01-11

## 2021-03-18 ENCOUNTER — PATIENT MESSAGE (OUTPATIENT)
Dept: UROGYNECOLOGY | Facility: CLINIC | Age: 36
End: 2021-03-18

## 2021-03-19 ENCOUNTER — PATIENT MESSAGE (OUTPATIENT)
Dept: UROGYNECOLOGY | Facility: CLINIC | Age: 36
End: 2021-03-19

## 2021-03-19 RX ORDER — DIAZEPAM 2 MG/1
2 TABLET ORAL NIGHTLY
Qty: 30 TABLET | Refills: 2 | Status: SHIPPED | OUTPATIENT
Start: 2021-03-19 | End: 2021-04-18

## 2021-04-05 ENCOUNTER — OFFICE VISIT (OUTPATIENT)
Dept: URGENT CARE | Facility: CLINIC | Age: 36
End: 2021-04-05
Payer: MEDICAID

## 2021-04-05 VITALS
RESPIRATION RATE: 18 BRPM | HEIGHT: 68 IN | WEIGHT: 160 LBS | HEART RATE: 81 BPM | TEMPERATURE: 98 F | OXYGEN SATURATION: 98 % | BODY MASS INDEX: 24.25 KG/M2 | SYSTOLIC BLOOD PRESSURE: 134 MMHG | DIASTOLIC BLOOD PRESSURE: 97 MMHG

## 2021-04-05 DIAGNOSIS — Z11.59 SCREENING FOR VIRAL DISEASE: Primary | ICD-10-CM

## 2021-04-05 DIAGNOSIS — R05.9 COUGH: ICD-10-CM

## 2021-04-05 DIAGNOSIS — J01.40 ACUTE PANSINUSITIS, RECURRENCE NOT SPECIFIED: ICD-10-CM

## 2021-04-05 LAB
CTP QC/QA: YES
CTP QC/QA: YES
MOLECULAR STREP A: NEGATIVE
SARS-COV-2 RDRP RESP QL NAA+PROBE: NEGATIVE

## 2021-04-05 PROCEDURE — 99214 OFFICE O/P EST MOD 30 MIN: CPT | Mod: S$GLB,CS,, | Performed by: PHYSICIAN ASSISTANT

## 2021-04-05 PROCEDURE — U0002: ICD-10-PCS | Mod: QW,S$GLB,, | Performed by: PHYSICIAN ASSISTANT

## 2021-04-05 PROCEDURE — U0002 COVID-19 LAB TEST NON-CDC: HCPCS | Mod: QW,S$GLB,, | Performed by: PHYSICIAN ASSISTANT

## 2021-04-05 PROCEDURE — 87651 POCT STREP A MOLECULAR: ICD-10-PCS | Mod: QW,S$GLB,, | Performed by: PHYSICIAN ASSISTANT

## 2021-04-05 PROCEDURE — 87651 STREP A DNA AMP PROBE: CPT | Mod: QW,S$GLB,, | Performed by: PHYSICIAN ASSISTANT

## 2021-04-05 PROCEDURE — 99214 PR OFFICE/OUTPT VISIT, EST, LEVL IV, 30-39 MIN: ICD-10-PCS | Mod: S$GLB,CS,, | Performed by: PHYSICIAN ASSISTANT

## 2021-04-05 RX ORDER — AMOXICILLIN AND CLAVULANATE POTASSIUM 875; 125 MG/1; MG/1
1 TABLET, FILM COATED ORAL EVERY 12 HOURS
Qty: 14 TABLET | Refills: 0 | Status: SHIPPED | OUTPATIENT
Start: 2021-04-05 | End: 2021-04-12

## 2021-04-05 RX ORDER — ALBUTEROL SULFATE 90 UG/1
2 AEROSOL, METERED RESPIRATORY (INHALATION) EVERY 6 HOURS PRN
Qty: 18 G | Refills: 0 | Status: SHIPPED | OUTPATIENT
Start: 2021-04-05 | End: 2022-04-05

## 2021-04-05 RX ORDER — PREDNISONE 20 MG/1
20 TABLET ORAL DAILY
Qty: 5 TABLET | Refills: 0 | Status: SHIPPED | OUTPATIENT
Start: 2021-04-05 | End: 2021-04-10

## 2021-04-05 RX ORDER — PROMETHAZINE HYDROCHLORIDE AND DEXTROMETHORPHAN HYDROBROMIDE 6.25; 15 MG/5ML; MG/5ML
5 SYRUP ORAL EVERY 4 HOURS PRN
Qty: 180 ML | Refills: 0 | Status: SHIPPED | OUTPATIENT
Start: 2021-04-05 | End: 2021-04-15

## 2021-05-04 ENCOUNTER — PATIENT MESSAGE (OUTPATIENT)
Dept: RESEARCH | Facility: HOSPITAL | Age: 36
End: 2021-05-04

## 2021-05-10 ENCOUNTER — PATIENT MESSAGE (OUTPATIENT)
Dept: RESEARCH | Facility: HOSPITAL | Age: 36
End: 2021-05-10

## (undated) DEVICE — GLOVE PROTEXIS PI CLASSIC 7.5

## (undated) DEVICE — SYS LABEL CORRECT MED

## (undated) DEVICE — SPONGE GAUZE 16PLY 4X4

## (undated) DEVICE — SOL IRR WATER STRL 3000 ML

## (undated) DEVICE — SHEET DRAPE MEDIUM

## (undated) DEVICE — SEE MEDLINE ITEM 88971

## (undated) DEVICE — SEE MEDLINE ITEM 157116

## (undated) DEVICE — SET CYSTO IRRIGATION UNIV SPIK

## (undated) DEVICE — SEE L#133928

## (undated) DEVICE — SEE MEDLINE ITEM 157185

## (undated) DEVICE — SCRUB 10% POVIDONE IODINE 4OZ

## (undated) DEVICE — SEE L#120831

## (undated) DEVICE — SEE MEDLINE ITEM 157181

## (undated) DEVICE — SYR 10CC LUER LOCK

## (undated) DEVICE — GLOVE SURG ULTRA TOUCH 6